# Patient Record
Sex: FEMALE | Race: WHITE | NOT HISPANIC OR LATINO | URBAN - METROPOLITAN AREA
[De-identification: names, ages, dates, MRNs, and addresses within clinical notes are randomized per-mention and may not be internally consistent; named-entity substitution may affect disease eponyms.]

---

## 2017-07-17 ENCOUNTER — INPATIENT (INPATIENT)
Facility: HOSPITAL | Age: 82
LOS: 9 days | DRG: 871 | End: 2017-07-27
Attending: INTERNAL MEDICINE | Admitting: INTERNAL MEDICINE
Payer: MEDICARE

## 2017-07-17 VITALS
SYSTOLIC BLOOD PRESSURE: 134 MMHG | HEART RATE: 95 BPM | RESPIRATION RATE: 22 BRPM | TEMPERATURE: 96 F | WEIGHT: 145.06 LBS | DIASTOLIC BLOOD PRESSURE: 77 MMHG | OXYGEN SATURATION: 86 %

## 2017-07-17 DIAGNOSIS — F02.81 DEMENTIA IN OTHER DISEASES CLASSIFIED ELSEWHERE, UNSPECIFIED SEVERITY, WITH BEHAVIORAL DISTURBANCE: ICD-10-CM

## 2017-07-17 DIAGNOSIS — M24.7 PROTRUSIO ACETABULI: ICD-10-CM

## 2017-07-17 DIAGNOSIS — R32 UNSPECIFIED URINARY INCONTINENCE: ICD-10-CM

## 2017-07-17 DIAGNOSIS — I24.8 OTHER FORMS OF ACUTE ISCHEMIC HEART DISEASE: ICD-10-CM

## 2017-07-17 DIAGNOSIS — Z79.82 LONG TERM (CURRENT) USE OF ASPIRIN: ICD-10-CM

## 2017-07-17 DIAGNOSIS — R55 SYNCOPE AND COLLAPSE: ICD-10-CM

## 2017-07-17 DIAGNOSIS — I95.9 HYPOTENSION, UNSPECIFIED: ICD-10-CM

## 2017-07-17 DIAGNOSIS — A41.9 SEPSIS, UNSPECIFIED ORGANISM: ICD-10-CM
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
## 2017-07-17 DIAGNOSIS — Z96.0 PRESENCE OF UROGENITAL IMPLANTS: ICD-10-CM

## 2017-07-17 DIAGNOSIS — D50.0 IRON DEFICIENCY ANEMIA SECONDARY TO BLOOD LOSS (CHRONIC): ICD-10-CM

## 2017-07-17 DIAGNOSIS — E87.2 ACIDOSIS: ICD-10-CM
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
## 2017-07-17 DIAGNOSIS — L89.629 PRESSURE ULCER OF LEFT HEEL, UNSPECIFIED STAGE: ICD-10-CM

## 2017-07-17 DIAGNOSIS — G30.9 ALZHEIMER'S DISEASE, UNSPECIFIED: ICD-10-CM

## 2017-07-17 DIAGNOSIS — Z29.9 ENCOUNTER FOR PROPHYLACTIC MEASURES, UNSPECIFIED: ICD-10-CM

## 2017-07-17 DIAGNOSIS — Z66 DO NOT RESUSCITATE: ICD-10-CM

## 2017-07-17 DIAGNOSIS — S32.302A UNSPECIFIED FRACTURE OF LEFT ILIUM, INITIAL ENCOUNTER FOR CLOSED FRACTURE: ICD-10-CM

## 2017-07-17 DIAGNOSIS — Z82.49 FAMILY HISTORY OF ISCHEMIC HEART DISEASE AND OTHER DISEASES OF THE CIRCULATORY SYSTEM: ICD-10-CM

## 2017-07-17 DIAGNOSIS — S83.90XA SPRAIN OF UNSPECIFIED SITE OF UNSPECIFIED KNEE, INITIAL ENCOUNTER: Chronic | ICD-10-CM
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
## 2017-07-17 DIAGNOSIS — I35.1 NONRHEUMATIC AORTIC (VALVE) INSUFFICIENCY: ICD-10-CM

## 2017-07-17 PROBLEM — Z00.00 ENCOUNTER FOR PREVENTIVE HEALTH EXAMINATION: Status: ACTIVE | Noted: 2017-07-17

## 2017-07-17 LAB
ALBUMIN SERPL ELPH-MCNC: 3.2 G/DL — LOW (ref 3.3–5)
ALP SERPL-CCNC: 107 U/L — SIGNIFICANT CHANGE UP (ref 40–120)
ALT FLD-CCNC: 60 U/L — SIGNIFICANT CHANGE UP (ref 12–78)
ANION GAP SERPL CALC-SCNC: 9 MMOL/L — SIGNIFICANT CHANGE UP (ref 5–17)
APTT BLD: 46 SEC — HIGH (ref 27.5–37.4)
AST SERPL-CCNC: 44 U/L — HIGH (ref 15–37)
BASE EXCESS BLDA CALC-SCNC: -14.6 MMOL/L — LOW (ref -2–2)
BASOPHILS # BLD AUTO: 0.2 K/UL — SIGNIFICANT CHANGE UP (ref 0–0.2)
BASOPHILS NFR BLD AUTO: 0.6 % — SIGNIFICANT CHANGE UP (ref 0–2)
BILIRUB SERPL-MCNC: 0.6 MG/DL — SIGNIFICANT CHANGE UP (ref 0.2–1.2)
BLOOD GAS COMMENTS ARTERIAL: SIGNIFICANT CHANGE UP
BUN SERPL-MCNC: 17 MG/DL — SIGNIFICANT CHANGE UP (ref 7–23)
CALCIUM SERPL-MCNC: 8.8 MG/DL — SIGNIFICANT CHANGE UP (ref 8.5–10.1)
CHLORIDE SERPL-SCNC: 115 MMOL/L — HIGH (ref 96–108)
CK MB BLD-MCNC: 1.4 % — SIGNIFICANT CHANGE UP (ref 0–3.5)
CK MB CFR SERPL CALC: 1.8 NG/ML — SIGNIFICANT CHANGE UP (ref 0–3.6)
CK SERPL-CCNC: 133 U/L — SIGNIFICANT CHANGE UP (ref 26–192)
CO2 SERPL-SCNC: 21 MMOL/L — LOW (ref 22–31)
CREAT SERPL-MCNC: 0.93 MG/DL — SIGNIFICANT CHANGE UP (ref 0.5–1.3)
EOSINOPHIL # BLD AUTO: 0 K/UL — SIGNIFICANT CHANGE UP (ref 0–0.5)
EOSINOPHIL NFR BLD AUTO: 0.2 % — SIGNIFICANT CHANGE UP (ref 0–6)
GLUCOSE SERPL-MCNC: 139 MG/DL — HIGH (ref 70–99)
HCO3 BLDA-SCNC: 14 MMOL/L — LOW (ref 23–27)
HCT VFR BLD CALC: 42.7 % — SIGNIFICANT CHANGE UP (ref 34.5–45)
HGB BLD-MCNC: 14.6 G/DL — SIGNIFICANT CHANGE UP (ref 11.5–15.5)
HOROWITZ INDEX BLDA+IHG-RTO: 40 — SIGNIFICANT CHANGE UP
INR BLD: 1.26 RATIO — HIGH (ref 0.88–1.16)
LACTATE SERPL-SCNC: 3.7 MMOL/L — HIGH (ref 0.7–2)
LACTATE SERPL-SCNC: 5 MMOL/L — CRITICAL HIGH (ref 0.7–2)
LYMPHOCYTES # BLD AUTO: 13.3 % — SIGNIFICANT CHANGE UP (ref 13–44)
LYMPHOCYTES # BLD AUTO: 3.3 K/UL — SIGNIFICANT CHANGE UP (ref 1–3.3)
MCHC RBC-ENTMCNC: 33.5 PG — SIGNIFICANT CHANGE UP (ref 27–34)
MCHC RBC-ENTMCNC: 34.3 GM/DL — SIGNIFICANT CHANGE UP (ref 32–36)
MCV RBC AUTO: 97.6 FL — SIGNIFICANT CHANGE UP (ref 80–100)
MONOCYTES # BLD AUTO: 1.2 K/UL — HIGH (ref 0–0.9)
MONOCYTES NFR BLD AUTO: 4.7 % — SIGNIFICANT CHANGE UP (ref 1–9)
NEUTROPHILS # BLD AUTO: 19.9 K/UL — HIGH (ref 1.8–7.4)
NEUTROPHILS NFR BLD AUTO: 81.2 % — HIGH (ref 43–77)
PCO2 BLDA: 19 MMHG — LOW (ref 32–46)
PH BLDA: 7.35 — SIGNIFICANT CHANGE UP (ref 7.35–7.45)
PLATELET # BLD AUTO: 192 K/UL — SIGNIFICANT CHANGE UP (ref 150–400)
PO2 BLDA: 62 MMHG — LOW (ref 74–108)
POTASSIUM SERPL-MCNC: 3.8 MMOL/L — SIGNIFICANT CHANGE UP (ref 3.5–5.3)
POTASSIUM SERPL-SCNC: 3.8 MMOL/L — SIGNIFICANT CHANGE UP (ref 3.5–5.3)
PROT SERPL-MCNC: 6.2 G/DL — SIGNIFICANT CHANGE UP (ref 6–8.3)
PROTHROM AB SERPL-ACNC: 13.8 SEC — HIGH (ref 9.8–12.7)
RBC # BLD: 4.37 M/UL — SIGNIFICANT CHANGE UP (ref 3.8–5.2)
RBC # FLD: 12.4 % — SIGNIFICANT CHANGE UP (ref 10.3–14.5)
SAO2 % BLDA: 89 % — LOW (ref 92–96)
SODIUM SERPL-SCNC: 145 MMOL/L — SIGNIFICANT CHANGE UP (ref 135–145)
TROPONIN I SERPL-MCNC: 0.05 NG/ML — HIGH (ref 0.01–0.04)
WBC # BLD: 24.6 K/UL — HIGH (ref 3.8–10.5)
WBC # FLD AUTO: 24.6 K/UL — HIGH (ref 3.8–10.5)

## 2017-07-17 PROCEDURE — 99291 CRITICAL CARE FIRST HOUR: CPT

## 2017-07-17 PROCEDURE — 93010 ELECTROCARDIOGRAM REPORT: CPT

## 2017-07-17 PROCEDURE — 71010: CPT | Mod: 26

## 2017-07-17 PROCEDURE — 70450 CT HEAD/BRAIN W/O DYE: CPT | Mod: 26

## 2017-07-17 RX ORDER — NOREPINEPHRINE BITARTRATE/D5W 8 MG/250ML
0.1 PLASTIC BAG, INJECTION (ML) INTRAVENOUS
Qty: 8 | Refills: 0 | Status: DISCONTINUED | OUTPATIENT
Start: 2017-07-17 | End: 2017-07-20

## 2017-07-17 RX ORDER — ACETAMINOPHEN 500 MG
650 TABLET ORAL EVERY 6 HOURS
Qty: 0 | Refills: 0 | Status: DISCONTINUED | OUTPATIENT
Start: 2017-07-17 | End: 2017-07-27

## 2017-07-17 RX ORDER — PIPERACILLIN AND TAZOBACTAM 4; .5 G/20ML; G/20ML
3.38 INJECTION, POWDER, LYOPHILIZED, FOR SOLUTION INTRAVENOUS EVERY 8 HOURS
Qty: 0 | Refills: 0 | Status: DISCONTINUED | OUTPATIENT
Start: 2017-07-17 | End: 2017-07-18

## 2017-07-17 RX ORDER — SODIUM CHLORIDE 9 MG/ML
1000 INJECTION INTRAMUSCULAR; INTRAVENOUS; SUBCUTANEOUS
Qty: 0 | Refills: 0 | Status: COMPLETED | OUTPATIENT
Start: 2017-07-17 | End: 2017-07-17

## 2017-07-17 RX ORDER — SODIUM BICARBONATE 1 MEQ/ML
50 SYRINGE (ML) INTRAVENOUS ONCE
Qty: 0 | Refills: 0 | Status: COMPLETED | OUTPATIENT
Start: 2017-07-17 | End: 2017-07-17

## 2017-07-17 RX ORDER — PIPERACILLIN AND TAZOBACTAM 4; .5 G/20ML; G/20ML
3.38 INJECTION, POWDER, LYOPHILIZED, FOR SOLUTION INTRAVENOUS ONCE
Qty: 0 | Refills: 0 | Status: COMPLETED | OUTPATIENT
Start: 2017-07-17 | End: 2017-07-17

## 2017-07-17 RX ORDER — SODIUM CHLORIDE 9 MG/ML
1000 INJECTION, SOLUTION INTRAVENOUS
Qty: 0 | Refills: 0 | Status: DISCONTINUED | OUTPATIENT
Start: 2017-07-17 | End: 2017-07-17

## 2017-07-17 RX ORDER — ENOXAPARIN SODIUM 100 MG/ML
40 INJECTION SUBCUTANEOUS EVERY 24 HOURS
Qty: 0 | Refills: 0 | Status: DISCONTINUED | OUTPATIENT
Start: 2017-07-17 | End: 2017-07-18

## 2017-07-17 RX ORDER — VANCOMYCIN HCL 1 G
1000 VIAL (EA) INTRAVENOUS ONCE
Qty: 0 | Refills: 0 | Status: COMPLETED | OUTPATIENT
Start: 2017-07-17 | End: 2017-07-17

## 2017-07-17 RX ORDER — SODIUM CHLORIDE 9 MG/ML
1000 INJECTION INTRAMUSCULAR; INTRAVENOUS; SUBCUTANEOUS ONCE
Qty: 0 | Refills: 0 | Status: COMPLETED | OUTPATIENT
Start: 2017-07-17 | End: 2017-07-17

## 2017-07-17 RX ORDER — ONDANSETRON 8 MG/1
4 TABLET, FILM COATED ORAL ONCE
Qty: 0 | Refills: 0 | Status: COMPLETED | OUTPATIENT
Start: 2017-07-17 | End: 2017-07-17

## 2017-07-17 RX ORDER — SODIUM BICARBONATE 1 MEQ/ML
0.23 SYRINGE (ML) INTRAVENOUS
Qty: 150 | Refills: 0 | Status: DISCONTINUED | OUTPATIENT
Start: 2017-07-17 | End: 2017-07-18

## 2017-07-17 RX ORDER — SODIUM CHLORIDE 9 MG/ML
1000 INJECTION, SOLUTION INTRAVENOUS ONCE
Qty: 0 | Refills: 0 | Status: COMPLETED | OUTPATIENT
Start: 2017-07-17 | End: 2017-07-17

## 2017-07-17 RX ORDER — NOREPINEPHRINE BITARTRATE/D5W 8 MG/250ML
1 PLASTIC BAG, INJECTION (ML) INTRAVENOUS
Qty: 8 | Refills: 0 | Status: DISCONTINUED | OUTPATIENT
Start: 2017-07-17 | End: 2017-07-17

## 2017-07-17 RX ADMIN — Medication 123.38 MICROGRAM(S)/KG/MIN: at 14:02

## 2017-07-17 RX ADMIN — SODIUM CHLORIDE 2000 MILLILITER(S): 9 INJECTION, SOLUTION INTRAVENOUS at 14:04

## 2017-07-17 RX ADMIN — Medication 50 MILLIEQUIVALENT(S): at 22:08

## 2017-07-17 RX ADMIN — Medication 100 MEQ/KG/HR: at 22:03

## 2017-07-17 RX ADMIN — SODIUM CHLORIDE 2000 MILLILITER(S): 9 INJECTION INTRAMUSCULAR; INTRAVENOUS; SUBCUTANEOUS at 10:15

## 2017-07-17 RX ADMIN — ENOXAPARIN SODIUM 40 MILLIGRAM(S): 100 INJECTION SUBCUTANEOUS at 17:24

## 2017-07-17 RX ADMIN — PIPERACILLIN AND TAZOBACTAM 25 GRAM(S): 4; .5 INJECTION, POWDER, LYOPHILIZED, FOR SOLUTION INTRAVENOUS at 21:32

## 2017-07-17 RX ADMIN — SODIUM CHLORIDE 2000 MILLILITER(S): 9 INJECTION, SOLUTION INTRAVENOUS at 18:20

## 2017-07-17 RX ADMIN — Medication 250 MILLIGRAM(S): at 11:43

## 2017-07-17 RX ADMIN — ONDANSETRON 4 MILLIGRAM(S): 8 TABLET, FILM COATED ORAL at 12:30

## 2017-07-17 RX ADMIN — SODIUM CHLORIDE 2000 MILLILITER(S): 9 INJECTION INTRAMUSCULAR; INTRAVENOUS; SUBCUTANEOUS at 11:17

## 2017-07-17 RX ADMIN — PIPERACILLIN AND TAZOBACTAM 200 GRAM(S): 4; .5 INJECTION, POWDER, LYOPHILIZED, FOR SOLUTION INTRAVENOUS at 11:13

## 2017-07-17 RX ADMIN — SODIUM CHLORIDE 2000 MILLILITER(S): 9 INJECTION INTRAMUSCULAR; INTRAVENOUS; SUBCUTANEOUS at 09:45

## 2017-07-17 RX ADMIN — SODIUM CHLORIDE 1000 MILLILITER(S): 9 INJECTION INTRAMUSCULAR; INTRAVENOUS; SUBCUTANEOUS at 12:30

## 2017-07-17 RX ADMIN — Medication 123.38 MICROGRAM(S)/KG/MIN: at 11:43

## 2017-07-17 NOTE — H&P ADULT - PROBLEM SELECTOR PLAN 2
continue aspirin qd  trend troponins, check echo; evaluate for cardiac etiology with hx of severe AS  CT head negative for acute pathology, f/u neuro recs  Cardiology (Dr. Borjas) consulted, Neuro (Dr. Gallegos) consulted

## 2017-07-17 NOTE — ED PROCEDURE NOTE - CPROC ED INTRAOSS CONFIRM1
flushing with fluid/needle stands without support/aspiration of blood/marrow mixture without difficulty

## 2017-07-17 NOTE — ED ADULT NURSE REASSESSMENT NOTE - NS ED NURSE REASSESS COMMENT FT1
spoke  with lab department about drawing the cultures, they were up drawing blood, and did not draw cultures

## 2017-07-17 NOTE — CONSULT NOTE ADULT - SUBJECTIVE AND OBJECTIVE BOX
CC: 82F presents with syncope and possible seizure with AMS.    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 She lives with  and has 24-hr aide who is at bedside. Per aide, she became stiff with hands outstretched and had generalized body shaking, then became unresponsive for about 10-15 minutes. Also, family states that she has been grinding her teeth more the last few days with episode of crying yesterday. Patient does not take any meds (only baby aspirin). Used to take valproic acid for behavioral disturbance due to alzheimer's but that had been discontinued over 6 months ago.    In the ED, patient was afebrile but hypotensive to 70s/40s. She received 3 liters NS with no response in BP, required starting Norepinephrine drip via IO placed in left proximal tibia. She received Vanc/Zosyn for suspected UTI, although burns had no urine output. CT Head showed no acute pathology and CXR showed no focal consolidation worrisome for pneumonia.    Allergies: No Known Allergies    PAST MEDICAL & SURGICAL HISTORY:  Aortic stenosis  Alzheimer disease (advanced: nonverbal, fully dependent on ADL's, on pureed diet)  Meniscal injury: s/p arthroscopy    FAMILY HISTORY:  Family history of MI (myocardial infarction)    SOCIAL HISTORY: never smoker, no alcohol or illicit drug use    Home Medications: aspirin 81 mg qd    Review of Systems: unable to obtain due to dementia    T(F): 97.8 (07-17-17 @ 12:59), Max: 98.8 (07-17-17 @ 09:50)  HR: 96 (07-17-17 @ 12:59) (81 - 96)  BP: 144/75 (07-17-17 @ 12:59) (70/39 - 161/54)  RR: 22 (07-17-17 @ 12:59) (22 - 22)  SpO2: 100% (07-17-17 @ 12:59)  Wt(kg): 65.8 kg    Physical Exam:     Gen: appears in distress, making noises, aggressively grinding teeth  Neuro:  does not respond to verbal stimuli (baseline per family); PERRL  HEENT: NC/AT; dry mucous membranes  CVS: normal S1 & S2; regular rate and rhythm   Resp: clear to auscultation bilaterally   Abd: soft; NT/ND   Ext: no edema; poor skin turgor  Skin: warm, well perfused; ulcer over left heel with minimal drainage, appears clean    Meds:  piperacillin/tazobactam IVPB. 3.375 Gram(s) IV Intermittent every 8 hours  norepinephrine Infusion 1 MICROgram(s)/kG/Min IV Continuous <Continuous>  acetaminophen  Suppository 650 milliGRAM(s) Rectal every 6 hours PRN  enoxaparin Injectable 40 milliGRAM(s) SubCutaneous every 24 hours  lactated ringers Bolus 1000 milliLiter(s) IV Bolus once                       14.6   24.6  )-----------( 192      ( 17 Jul 2017 09:59 )             42.7     145  |  115  |  17  ----------------------------<  139  3.8   |  21  |  0.93    Ca    8.8      17 Jul 2017 11:07    TPro  6.2  /  Alb  3.2<L>  /  TBili  0.6  /  DBili  x   /  AST  44<H>  /  ALT  60  /  AlkPhos  107  07-17    Lactate 3.7           07-17 @ 11:09    Lactate 5.0           07-17 @ 10:00      CARDIAC MARKERS ( 17 Jul 2017 11:09 )  .053 ng/mL / x     / 133 U/L / x     / 1.8 ng/mL    PT/INR - ( 17 Jul 2017 11:09 )   PT: 13.8 sec;   INR: 1.26 ratio      PTT - ( 17 Jul 2017 11:09 )  PTT:46.0 sec    Blood cultures: pending    UA/UCx: no urine in burns    CXR: clear lungs, no focal consolidation, no effusion, no pneumothorax    CODE STATUS: full  GOC discussion: Y  Critical care time spent (mins): 45

## 2017-07-17 NOTE — ED PROVIDER NOTE - CONSTITUTIONAL, MLM
normal... Well appearing, well nourished, awake, alert, moaning, some oriented to person, place, time/situation and in no apparent distress.

## 2017-07-17 NOTE — CONSULT NOTE ADULT - ASSESSMENT
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    1. Neuro: mental status now at baseline, hold off on antiepileptics at this point  2. CV: likely distributive shock, c/w norepinephrine, consider switch to phenylephrine given reported history of severe AS. Awaiting echo results from cardiologist (Dr. Durand). c/w asa 81 qd.  3. Pulm: no active issues, c/w supp O2 prn  4. GI: NPO for now  5. Renal/Metabolic: no urine output, monitor closely, bladder scan when arrives to ICU, keep burns in place. Strict I/O's, monitor BUN/Cr/K/HCO3. Start D5+LR@75/hr while NPO  6. ID: f/up cultures, c/w Zosyn, hold Vancomycin given not recently hospitalized  7. Heme: DVT ppx, trend leukocytosis (likely due to sepsis vs. seizure)  8. Endo: no active issues  9. Skin: L heel ulcer, does not appear infected, wound care eval; needs TLC  10. Dispo: full code, discussed with  and daughter at bedside in ED. Would not want prolonged life support, but will discuss this at that time should her status deteriorate. 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    1. Neuro: mental status now at baseline, hold off on antiepileptics at this point. Possible vasovagal syncope vs. seizure.  2. CV: likely distributive shock, c/w norepinephrine, consider switch to phenylephrine given reported history of severe AS. Awaiting echo results from cardiologist (Dr. Durand). c/w asa 81 qd.  3. Pulm: no active issues, c/w supp O2 prn  4. GI: NPO for now  5. Renal/Metabolic: no urine output, monitor closely, bladder scan when arrives to ICU, keep burns in place. Strict I/O's, monitor BUN/Cr/K/HCO3. Start D5+LR@75/hr while NPO  6. ID: f/up cultures, c/w Zosyn, hold Vancomycin given not recently hospitalized  7. Heme: DVT ppx, trend leukocytosis (likely due to sepsis vs. seizure)  8. Endo: no active issues  9. Skin: L heel ulcer, does not appear infected, wound care eval; needs TLC  10. Dispo: full code, discussed with  and daughter at bedside in ED. Would not want prolonged life support, but will discuss this at that time should her status deteriorate. 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    1. Neuro: mental status now at baseline, hold off on antiepileptics at this point. Possible vasovagal syncope vs. seizure.  2. CV: likely distributive shock, c/w norepinephrine, consider switch to phenylephrine given reported history of severe AS. Awaiting echo results from cardiologist (Dr. Durand). c/w asa 81 qd.  3. Pulm: no active issues, c/w supp O2 prn  4. GI: NPO for now  5. Renal/Metabolic: no urine output, monitor closely, bladder scan when arrives to ICU, keep burns in place. Strict I/O's, monitor BUN/Cr/K/HCO3. Start D5+LR@75/hr while NPO  6. ID: f/up cultures, c/w Zosyn, hold Vancomycin given not recently hospitalized  7. Heme: DVT ppx, trend leukocytosis (likely due to sepsis vs. seizure)  8. Endo: no active issues  9. Skin: L heel ulcer, does not appear infected, wound care eval; resolved shock, d/c IO in AM if remains stable, PIV in R arm placed  10. Dispo: full code, discussed with  and daughter at bedside in ED. Would not want prolonged life support, but will discuss this at that time should her status deteriorate. Prognosis guarded  CC time spent: 45 min

## 2017-07-17 NOTE — ED PROCEDURE NOTE - CPROC ED INFUS LINE DETAIL1
The location was identified, and the area was draped and prepped./The catheter was placed using sterile technique.

## 2017-07-17 NOTE — ED PROVIDER NOTE - OBJECTIVE STATEMENT
83 yo F p/w this am was in her nl baseline. Then ~ 45 min pta, pt passed out on toilet. No fall / trauma. Pt was found by EMS to be hypotensive and hypoxic. Pt is now more altered than usual. No focal weakness noted. No recent fall / trauma. NO recent illness. NO other hx avail.

## 2017-07-17 NOTE — CONSULT NOTE ADULT - SUBJECTIVE AND OBJECTIVE BOX
CMS-- episode of stiffening --- questionable sz, vs stiffening secondary to cerebral hypoperfusion -- keeps -120-- no AED at present  speiss work up   spoke to son shaka

## 2017-07-17 NOTE — ED PROVIDER NOTE - CRITICAL CARE PROVIDED
additional history taking/interpretation of diagnostic studies/consult w/ pt's family directly relating to pts condition/consultation with other physicians/direct patient care (not related to procedure)/documentation

## 2017-07-17 NOTE — ED ADULT NURSE REASSESSMENT NOTE - NS ED NURSE REASSESS COMMENT FT1
Pt is hypotensive. Norephinephrine IV ordered BP is now 161/54. additional bolus given. IO on the left knee started. abx started. Bladder scan showed 21cc, no urine output from straight cath.  Rectal temp of 98.8. Pt sating at 100 on 2 L NC. HRs at 40s, Dr Vidal aware, verbal order received to increased norephinephrine to 2, and immediately decrease to 1 mcg. when HR went up to 80s. awaiting ICU consult

## 2017-07-17 NOTE — ED ADULT NURSE NOTE - PLAN OF CARE
Bedside visitors/Position of comfort/Call bell/NPO/Side rails/Explanation of exam/test/Fall precautions

## 2017-07-17 NOTE — H&P ADULT - HISTORY OF PRESENT ILLNESS
81 y/o F with PMHx    In the ED, labs were significant for leukocytosis with WBC of 24.6, PT/INR of 13.8/1.26, aPTT of 46.0, Lactate of 5.0. Patient was afebrile but became hypotensive to 70/39. Given NS boluses x 5 and vanco/zosyn x 1. Had IO placed in left proximal tibia and started on levophed. EKG showed NSR at 66bpm, unchanged from prior study. CT head was negative for acute pathology, showed chronic brain findings c/w small vessel disease. CXR showed no acute pathology. Dr. Borjas (cardio) and Dr. Harris (ICU) evaluated patient. 83 y/o F with PMHx of Alzheimer Dementia, aortic stenosis presented to the ED with syncope and hypotension. Patient lives at home with  and 24 hour aide Romy. Per aide, patient was getting a sponge bath when her arms became stiff. She was on the commode and began "shaking violently," and held her hand to her chest and . Patient is non-verbal so  and aide were unsure if she was in pain or this was an involuntary motion. Family states that her current level of consciousness is her baseline. Family states that she has been grinding her teeth for the last 6 months, and was grinding it more intensely on the day prior to admission. Patient is incontinent of urine at baseline, using diapers. Family denies patient being febrile. Of note, patient was on depakote for Alzheimer dementia and not for seizures. Has been off depakote for the last year.      In the ED, labs were significant for leukocytosis with WBC of 24.6, PT/INR of 13.8/1.26, aPTT of 46.0, Lactate of 5.0. Patient was afebrile but became hypotensive to 70/39. Given NS boluses x 5 and vanco/zosyn x 1. Had IO placed in left proximal tibia and started on levophed. EKG showed NSR at 66bpm, unchanged from prior study. CT head was negative for acute pathology, showed chronic brain findings c/w small vessel disease. CXR showed no acute pathology. Dr. Borjas (cardio) and Dr. Harris (ICU) evaluated patient. 81 y/o F with PMHx of Alzheimer Dementia, aortic stenosis presented to the ED with syncope and hypotension. Patient lives at home with  and 24 hour aide Romy. Per aide, patient was getting a sponge bath when her arms became stiff. She was on the commode and began "shaking violently," and held her hand to her chest and . Patient is non-verbal so  and aide were unsure if she was in pain or this was an involuntary motion. Family states that her current level of consciousness is her baseline. Family states that she has been grinding her teeth for the last 6 months, and was grinding it more intensely on the day prior to admission. Patient is incontinent of urine at baseline, using diapers. Family denies patient being febrile. Had one episode of nonbloody, nonbilious vomiting in the ED. Of note, patient was on depakote for Alzheimer dementia and not for seizures. Has been off depakote for the last year.      In the ED, labs were significant for leukocytosis with WBC of 24.6, PT/INR of 13.8/1.26, aPTT of 46.0, Lactate of 5.0. Patient was afebrile but became hypotensive to 70/39. Given NS boluses x 5 and vanco/zosyn x 1. Had IO placed in left proximal tibia and started on levophed. EKG showed NSR at 66bpm, unchanged from prior study. CT head was negative for acute pathology, showed chronic brain findings c/w small vessel disease. CXR showed no acute pathology. Dr. Borjas (cardio) and Dr. Harris (ICU) evaluated patient.

## 2017-07-17 NOTE — H&P ADULT - NSHPSOCIALHISTORY_GEN_ALL_CORE
, lives with  and 24 hour aide  Ambulates with assistance from aide and family  Denies smoking  Denies ETOH use

## 2017-07-17 NOTE — H&P ADULT - ASSESSMENT
83 y/o F with PMHx of Alzheimer Dementia, aortic stenosis presented with syncopal episode admitted with septic shock, positive troponin.

## 2017-07-17 NOTE — H&P ADULT - PROBLEM SELECTOR PLAN 1
admit to ICU  continue aggressive fluid resuscitation with caution due to patient's severe AS   initiate pressor support through central line  f/u UA, Blood Cultures, Urinalysis, repeat lactate  investigate source of infection; check mouth closely for signs of abscess  continue broad spectrum antibiotics  wound care consult for left heel wound  further care per ICU

## 2017-07-17 NOTE — ED PROVIDER NOTE - PROGRESS NOTE DETAILS
Dw Pts Daughter in law, Dr Nanda Rojas, agree with rx, admit to [facility] / [facility] Pt with drop in SBP ~ 80 - L Tibial IO line placed. Dw Dr D [facility], will see pt to admit. Jeancarlos pt , family and Dr Rojas by phone. Jeancarlos Hagen, agree with Levo

## 2017-07-17 NOTE — ED ADULT NURSE NOTE - OBJECTIVE STATEMENT
Brought in by EMS, as per EMS, pt passed out in the toilet and was hypotensive. as per private care taker, denies any head trauma. Brought in by EMS, as per EMS, pt passed out in the toilet and was hypotensive. as per private care taker, denies any head trauma. patients skin was diaphoretic upon arrival to ER

## 2017-07-17 NOTE — H&P ADULT - NSHPSOURCEINFORD_GEN_ALL_CORE
Chart(s)/Other Family Member/Patient Chart(s)/Home Health Aide or Other Non-Family Caregiver/Spouse/Significant Other/Child

## 2017-07-17 NOTE — CONSULT NOTE ADULT - SUBJECTIVE AND OBJECTIVE BOX
CHIEF COMPLAINT: Patient is a 82y old  Female who presents with a chief complaint of Pt passed out at home (17 Jul 2017 15:56)      HPI:  83 y/o F with PMHx of Alzheimer Dementia, aortic stenosis presented to the ED with syncope and hypotension. Patient lives at home with  and 24 hour aide Romy. Per aide, patient was getting a sponge bath when her arms became stiff. She was on the commode and began "shaking violently," and held her hand to her chest and . Patient is non-verbal so  and aide were unsure if she was in pain or this was an involuntary motion. Family states that her current level of consciousness is her baseline. Family states that she has been grinding her teeth for the last 6 months, and was grinding it more intensely on the day prior to admission. Patient is incontinent of urine at baseline, using diapers. Family denies patient being febrile. Had one episode of nonbloody, nonbilious vomiting in the ED. Of note, patient was on depakote for Alzheimer dementia and not for seizures. Has been off depakote for the last year.      In the ED, labs were significant for leukocytosis with WBC of 24.6, PT/INR of 13.8/1.26, aPTT of 46.0, Lactate of 5.0. Patient was afebrile but became hypotensive to 70/39. Given NS boluses x 5 and vanco/zosyn x 1. Had IO placed in left proximal tibia and started on levophed. EKG showed NSR at 66bpm, unchanged from prior study. CT head was negative for acute pathology, showed chronic brain findings c/w small vessel disease. CXR showed no acute pathology. Dr. Borjas (cardio) and Dr. Harris (ICU) evaluated patient. (17 Jul 2017 12:14)      EKG: SR LVH c repol    REVIEW OF SYSTEMS:   All other review of systems are negative unless indicated above    PAST MEDICAL & SURGICAL HISTORY:  Aortic stenosis  Alzheimer disease  Meniscal injury: s/p arthroscopy      SOCIAL HISTORY:  No tobacco, ethanol, or drug abuse.    FAMILY HISTORY:  Family history of MI (myocardial infarction)    No family history of acute MI or sudden cardiac death.    MEDICATIONS  (STANDING):  enoxaparin Injectable 40 milliGRAM(s) SubCutaneous every 24 hours  piperacillin/tazobactam IVPB. 3.375 Gram(s) IV Intermittent every 8 hours  dextrose 5% + lactated ringers. 1000 milliLiter(s) (75 mL/Hr) IV Continuous <Continuous>  norepinephrine Infusion 0.1 MICROgram(s)/kG/Min (12.338 mL/Hr) IV Continuous <Continuous>    MEDICATIONS  (PRN):  acetaminophen  Suppository 650 milliGRAM(s) Rectal every 6 hours PRN For Temp greater than 38 C (100.4 F)      Allergies    No Known Allergies    Intolerances            VITAL SIGNS:   Vital Signs Last 24 Hrs  T(C): 36.1 (17 Jul 2017 17:00), Max: 37.1 (17 Jul 2017 09:50)  T(F): 97 (17 Jul 2017 17:00), Max: 98.8 (17 Jul 2017 09:50)  HR: 107 (17 Jul 2017 18:46) (81 - 117)  BP: 116/55 (17 Jul 2017 18:46) (70/39 - 161/54)  BP(mean): 79 (17 Jul 2017 18:46) (55 - 88)  RR: 28 (17 Jul 2017 18:46) (21 - 29)  SpO2: 98% (17 Jul 2017 18:46) (86% - 100%)    I&O's Summary    17 Jul 2017 07:01  -  17 Jul 2017 19:38  --------------------------------------------------------  IN: 1150 mL / OUT: 0 mL / NET: 1150 mL        On Exam:  TELE: SR  Constitutional: lethargic  HEENT: Dry Mucous Membranes, Anicteric  Pulmonary: Decreased breath sounds b/l.   Cardiovascular: Tachy , S1 and S2, distant 1/6 Sm  Gastrointestinal: Bowel Sounds present, soft, nontender.   Lymph: No peripheral edema. No lymphadenopathy.  Skin: No visible rashes or ulcers.  Psych:  lethagic    LABS: All Labs Reviewed:                        14.6   24.6  )-----------( 192      ( 17 Jul 2017 09:59 )             42.7     17 Jul 2017 11:07    145    |  115    |  17     ----------------------------<  139    3.8     |  21     |  0.93     Ca    8.8        17 Jul 2017 11:07    TPro  6.2    /  Alb  3.2    /  TBili  0.6    /  DBili  x      /  AST  44     /  ALT  60     /  AlkPhos  107    17 Jul 2017 11:07    PT/INR - ( 17 Jul 2017 11:09 )   PT: 13.8 sec;   INR: 1.26 ratio         PTT - ( 17 Jul 2017 11:09 )  PTT:46.0 sec  CARDIAC MARKERS ( 17 Jul 2017 11:09 )  .053 ng/mL / x     / 133 U/L / x     / 1.8 ng/mL      Blood Culture:         RADIOLOGY:    < from: Xray Chest 1 View AP/PA (07.17.17 @ 10:11) >    EXAM:  CHEST 1 VIEW                            PROCEDURE DATE:  07/17/2017          INTERPRETATION:  Syncope.    AP chest.    Heart not grossly enlarged. Atherosclerotic aorta. No consolidation or   effusion.        < from: CT Head No Cont (07.17.17 @ 10:43) >    EXAM:  CT BRAIN                            PROCEDURE DATE:  07/17/2017          INTERPRETATION:  CT head performed noncontrast mode. Patient has had an   episode of altered mental status and syncope. Time of study 10:21 AM.    The bones are grosslyintact.    In the head there is age typical advanced generalized volume loss of the   brain. There is abundant low dense change in the periventricular white   matter consistent with small vessel disease.    No acute blood collections or subarachnoid hemorrhages are evident.    There is no sense of acute territorial infarct, mass, or edema.    IMPRESSION: Chronic brain findings as above.                PAULA HICKEY M.D., ATTENDING RADIOLOGIST  This document has been electronically signed. Jul 17 2017 10:47AM                < end of copied text >        Impression: No active disease.                CHANCE MORGAN M.D., ATTENDING RADIOLOGIST  This document has been electronically signed. Jul 17 2017 10:12AM                < end of copied text >

## 2017-07-17 NOTE — H&P ADULT - NSHPPHYSICALEXAM_GEN_ALL_CORE
Physical Exam:  General: thin elderly female, nonverbal, appears distressed  HEENT: NCAT, PERRLA, EOMI bl, moist mucous membranes   Neck: Supple, nontender, no mass  Neurology: A&Ox3, nonfocal, CN II-XII grossly intact, sensation intact, no gait abnormalities   Respiratory: CTA B/L, No W/R/R  CV: RRR, +S1/S2, no murmurs, rubs or gallops  Abdominal: Soft, NT, ND +BSx4  Extremities: No C/C/E, + peripheral pulses  MSK: Normal ROM, no joint erythema or warmth, no joint swelling   Skin: warm, dry, normal color, no rash or abnormal lesions Physical Exam: limited due to patient's mental status  General: thin elderly female, nonverbal, appears distressed, grinding teeth  HEENT: NCAT, PERRLA, EOMI bl, dry mucous membranes, poor dentition  Neck: Supple, nontender, no mass  Neurology: A&Ox0, nonfocal exam, unable to fully assess neurological status due to patient's dementia  Respiratory: CTA B/L, No W/R/R  CV: RRR, +S1/S2, +systolic murmur  Abdominal: Soft, ND +BSx4  Extremities: No C/C/E, +peripheral pulses  MSK: Unable to assess ROM, no joint erythema or warmth, no joint swelling   Skin: warm, dry; +black eschar on left medial foot, +wound under left foot with minimal pus/drainage Physical Exam: limited due to patient's mental status  General: thin elderly female, nonverbal, appears distressed, grinding teeth  HEENT: NCAT, PERRLA, EOMI bl, dry mucous membranes, poor dentition  Neck: Supple, nontender, no mass  Neurology: A&Ox0, nonfocal exam, unable to fully assess neurological status due to patient's dementia  Respiratory: CTA B/L, No W/R/R  CV: RRR, +S1/S2, +systolic murmur  Abdominal: Soft, ND +BSx4  Extremities: No C/C/E, +peripheral pulses  MSK: Unable to assess ROM, no joint erythema or warmth, no joint swelling   Skin: warm, dry; +black eschar on left medial foot, +wound under left foot with minimal pus/drainage, +IO in left proximal tibia

## 2017-07-17 NOTE — CONSULT NOTE ADULT - ASSESSMENT
83 y/o F with PMHx of Alzheimer Dementia, severe aortic stenosis presented to the ED with syncope and hypotension.  - Likely pt is septic. Cont Abx.   - No off of pressor support. Cont IVF  - No signs of significant ischemia or volume overload. Monitor closely for ADHF given severe AS  - Now very tachypneic from acidosis. resp status tenuous  - Monitor and replete electrolytes. Keep K>4.0 and Mg>2.0.   - Further cardiac workup will depend on clinical course.   - All other workup per primary team. Will followup.   Pt with extremely poor prognosis. pall care would be appropriate.   Patient at high risk for decompensation. Critically ill. >35 minutes of critical care time was spent with this patient.

## 2017-07-17 NOTE — ED PROVIDER NOTE - CARE PLAN
Principal Discharge DX:	Syncope, unspecified syncope type  Secondary Diagnosis:	Leukocytosis, unspecified type  Secondary Diagnosis:	Hypotension, unspecified hypotension type

## 2017-07-18 DIAGNOSIS — G30.8 OTHER ALZHEIMER'S DISEASE: ICD-10-CM

## 2017-07-18 DIAGNOSIS — I35.0 NONRHEUMATIC AORTIC (VALVE) STENOSIS: ICD-10-CM

## 2017-07-18 DIAGNOSIS — K81.9 CHOLECYSTITIS, UNSPECIFIED: ICD-10-CM

## 2017-07-18 DIAGNOSIS — D72.829 ELEVATED WHITE BLOOD CELL COUNT, UNSPECIFIED: ICD-10-CM

## 2017-07-18 DIAGNOSIS — I21.4 NON-ST ELEVATION (NSTEMI) MYOCARDIAL INFARCTION: ICD-10-CM

## 2017-07-18 DIAGNOSIS — D50.0 IRON DEFICIENCY ANEMIA SECONDARY TO BLOOD LOSS (CHRONIC): ICD-10-CM

## 2017-07-18 DIAGNOSIS — S32.409A UNSPECIFIED FRACTURE OF UNSPECIFIED ACETABULUM, INITIAL ENCOUNTER FOR CLOSED FRACTURE: ICD-10-CM

## 2017-07-18 LAB
ABO RH CONFIRMATION: SIGNIFICANT CHANGE UP
ALBUMIN SERPL ELPH-MCNC: 2 G/DL — LOW (ref 3.3–5)
ALBUMIN SERPL ELPH-MCNC: 2.4 G/DL — LOW (ref 3.3–5)
ALP SERPL-CCNC: 43 U/L — SIGNIFICANT CHANGE UP (ref 40–120)
ALP SERPL-CCNC: 53 U/L — SIGNIFICANT CHANGE UP (ref 40–120)
ALT FLD-CCNC: 47 U/L — SIGNIFICANT CHANGE UP (ref 12–78)
ALT FLD-CCNC: 55 U/L — SIGNIFICANT CHANGE UP (ref 12–78)
ANION GAP SERPL CALC-SCNC: 14 MMOL/L — SIGNIFICANT CHANGE UP (ref 5–17)
ANION GAP SERPL CALC-SCNC: 17 MMOL/L — SIGNIFICANT CHANGE UP (ref 5–17)
ANION GAP SERPL CALC-SCNC: 18 MMOL/L — HIGH (ref 5–17)
ANION GAP SERPL CALC-SCNC: 19 MMOL/L — HIGH (ref 5–17)
APPEARANCE UR: ABNORMAL
APTT BLD: 34.8 SEC — SIGNIFICANT CHANGE UP (ref 27.5–37.4)
AST SERPL-CCNC: 61 U/L — HIGH (ref 15–37)
AST SERPL-CCNC: 65 U/L — HIGH (ref 15–37)
BACTERIA # UR AUTO: ABNORMAL
BASOPHILS # BLD AUTO: 0.1 K/UL — SIGNIFICANT CHANGE UP (ref 0–0.2)
BASOPHILS NFR BLD AUTO: 0.4 % — SIGNIFICANT CHANGE UP (ref 0–2)
BILIRUB DIRECT SERPL-MCNC: 0.1 MG/DL — SIGNIFICANT CHANGE UP (ref 0.05–0.2)
BILIRUB INDIRECT FLD-MCNC: 0.6 MG/DL — SIGNIFICANT CHANGE UP (ref 0.2–1)
BILIRUB SERPL-MCNC: 0.7 MG/DL — SIGNIFICANT CHANGE UP (ref 0.2–1.2)
BILIRUB SERPL-MCNC: 0.9 MG/DL — SIGNIFICANT CHANGE UP (ref 0.2–1.2)
BILIRUB UR-MCNC: NEGATIVE — SIGNIFICANT CHANGE UP
BUN SERPL-MCNC: 19 MG/DL — SIGNIFICANT CHANGE UP (ref 7–23)
BUN SERPL-MCNC: 24 MG/DL — HIGH (ref 7–23)
BUN SERPL-MCNC: 25 MG/DL — HIGH (ref 7–23)
BUN SERPL-MCNC: 25 MG/DL — HIGH (ref 7–23)
CALCIUM SERPL-MCNC: 6.3 MG/DL — CRITICAL LOW (ref 8.5–10.1)
CALCIUM SERPL-MCNC: 7.5 MG/DL — LOW (ref 8.5–10.1)
CALCIUM SERPL-MCNC: 7.7 MG/DL — LOW (ref 8.5–10.1)
CALCIUM SERPL-MCNC: 7.7 MG/DL — LOW (ref 8.5–10.1)
CHLORIDE SERPL-SCNC: 100 MMOL/L — SIGNIFICANT CHANGE UP (ref 96–108)
CHLORIDE SERPL-SCNC: 110 MMOL/L — HIGH (ref 96–108)
CHLORIDE SERPL-SCNC: 111 MMOL/L — HIGH (ref 96–108)
CHLORIDE SERPL-SCNC: 112 MMOL/L — HIGH (ref 96–108)
CK MB BLD-MCNC: 1.8 % — SIGNIFICANT CHANGE UP (ref 0–3.5)
CK MB BLD-MCNC: 2.7 % — SIGNIFICANT CHANGE UP (ref 0–3.5)
CK MB CFR SERPL CALC: 27.1 NG/ML — HIGH (ref 0–3.6)
CK MB CFR SERPL CALC: 31.2 NG/ML — HIGH (ref 0–3.6)
CK SERPL-CCNC: 1151 U/L — HIGH (ref 26–192)
CK SERPL-CCNC: 1498 U/L — HIGH (ref 26–192)
CO2 SERPL-SCNC: 15 MMOL/L — LOW (ref 22–31)
CO2 SERPL-SCNC: 17 MMOL/L — LOW (ref 22–31)
CO2 SERPL-SCNC: 19 MMOL/L — LOW (ref 22–31)
CO2 SERPL-SCNC: 31 MMOL/L — SIGNIFICANT CHANGE UP (ref 22–31)
COLOR SPEC: YELLOW — SIGNIFICANT CHANGE UP
COMMENT - URINE: SIGNIFICANT CHANGE UP
CREAT SERPL-MCNC: 1.4 MG/DL — HIGH (ref 0.5–1.3)
CREAT SERPL-MCNC: 1.4 MG/DL — HIGH (ref 0.5–1.3)
CREAT SERPL-MCNC: 1.5 MG/DL — HIGH (ref 0.5–1.3)
CREAT SERPL-MCNC: 1.6 MG/DL — HIGH (ref 0.5–1.3)
DIFF PNL FLD: ABNORMAL
EOSINOPHIL # BLD AUTO: 0 K/UL — SIGNIFICANT CHANGE UP (ref 0–0.5)
EOSINOPHIL NFR BLD AUTO: 0 % — SIGNIFICANT CHANGE UP (ref 0–6)
EPI CELLS # UR: SIGNIFICANT CHANGE UP
GLUCOSE SERPL-MCNC: 164 MG/DL — HIGH (ref 70–99)
GLUCOSE SERPL-MCNC: 174 MG/DL — HIGH (ref 70–99)
GLUCOSE SERPL-MCNC: 262 MG/DL — HIGH (ref 70–99)
GLUCOSE SERPL-MCNC: 550 MG/DL — CRITICAL HIGH (ref 70–99)
GLUCOSE UR QL: NEGATIVE — SIGNIFICANT CHANGE UP
HCT VFR BLD CALC: 20.1 % — CRITICAL LOW (ref 34.5–45)
HCT VFR BLD CALC: 23 % — LOW (ref 34.5–45)
HGB BLD-MCNC: 6.9 G/DL — CRITICAL LOW (ref 11.5–15.5)
HGB BLD-MCNC: 8 G/DL — LOW (ref 11.5–15.5)
HYALINE CASTS # UR AUTO: ABNORMAL /LPF
INR BLD: 1.83 RATIO — HIGH (ref 0.88–1.16)
KETONES UR-MCNC: ABNORMAL
LACTATE SERPL-SCNC: 11.7 MMOL/L — CRITICAL HIGH (ref 0.7–2)
LACTATE SERPL-SCNC: 12.9 MMOL/L — CRITICAL HIGH (ref 0.7–2)
LACTATE SERPL-SCNC: 13 MMOL/L — CRITICAL HIGH (ref 0.7–2)
LACTATE SERPL-SCNC: 13.7 MMOL/L — CRITICAL HIGH (ref 0.7–2)
LACTATE SERPL-SCNC: 13.8 MMOL/L — CRITICAL HIGH (ref 0.7–2)
LEUKOCYTE ESTERASE UR-ACNC: NEGATIVE — SIGNIFICANT CHANGE UP
LYMPHOCYTES # BLD AUTO: 1.7 K/UL — SIGNIFICANT CHANGE UP (ref 1–3.3)
LYMPHOCYTES # BLD AUTO: 8.1 % — LOW (ref 13–44)
MAGNESIUM SERPL-MCNC: 1.7 MG/DL — SIGNIFICANT CHANGE UP (ref 1.6–2.6)
MAGNESIUM SERPL-MCNC: 1.8 MG/DL — SIGNIFICANT CHANGE UP (ref 1.6–2.6)
MCHC RBC-ENTMCNC: 32.9 PG — SIGNIFICANT CHANGE UP (ref 27–34)
MCHC RBC-ENTMCNC: 33.2 PG — SIGNIFICANT CHANGE UP (ref 27–34)
MCHC RBC-ENTMCNC: 34.3 GM/DL — SIGNIFICANT CHANGE UP (ref 32–36)
MCHC RBC-ENTMCNC: 34.7 GM/DL — SIGNIFICANT CHANGE UP (ref 32–36)
MCV RBC AUTO: 95.6 FL — SIGNIFICANT CHANGE UP (ref 80–100)
MCV RBC AUTO: 95.8 FL — SIGNIFICANT CHANGE UP (ref 80–100)
MONOCYTES # BLD AUTO: 1 K/UL — HIGH (ref 0–0.9)
MONOCYTES NFR BLD AUTO: 4.5 % — SIGNIFICANT CHANGE UP (ref 1–9)
NEUTROPHILS # BLD AUTO: 18.7 K/UL — HIGH (ref 1.8–7.4)
NEUTROPHILS NFR BLD AUTO: 87 % — HIGH (ref 43–77)
NITRITE UR-MCNC: NEGATIVE — SIGNIFICANT CHANGE UP
OB PNL STL: NEGATIVE — SIGNIFICANT CHANGE UP
PH UR: 5 — SIGNIFICANT CHANGE UP (ref 5–8)
PHOSPHATE SERPL-MCNC: 2.6 MG/DL — SIGNIFICANT CHANGE UP (ref 2.5–4.5)
PHOSPHATE SERPL-MCNC: 2.7 MG/DL — SIGNIFICANT CHANGE UP (ref 2.5–4.5)
PLATELET # BLD AUTO: 55 K/UL — LOW (ref 150–400)
PLATELET # BLD AUTO: 70 K/UL — LOW (ref 150–400)
POTASSIUM SERPL-MCNC: 3.6 MMOL/L — SIGNIFICANT CHANGE UP (ref 3.5–5.3)
POTASSIUM SERPL-MCNC: 3.7 MMOL/L — SIGNIFICANT CHANGE UP (ref 3.5–5.3)
POTASSIUM SERPL-MCNC: 3.7 MMOL/L — SIGNIFICANT CHANGE UP (ref 3.5–5.3)
POTASSIUM SERPL-MCNC: 3.8 MMOL/L — SIGNIFICANT CHANGE UP (ref 3.5–5.3)
POTASSIUM SERPL-SCNC: 3.6 MMOL/L — SIGNIFICANT CHANGE UP (ref 3.5–5.3)
POTASSIUM SERPL-SCNC: 3.7 MMOL/L — SIGNIFICANT CHANGE UP (ref 3.5–5.3)
POTASSIUM SERPL-SCNC: 3.7 MMOL/L — SIGNIFICANT CHANGE UP (ref 3.5–5.3)
POTASSIUM SERPL-SCNC: 3.8 MMOL/L — SIGNIFICANT CHANGE UP (ref 3.5–5.3)
PROT SERPL-MCNC: 3.8 G/DL — LOW (ref 6–8.3)
PROT SERPL-MCNC: 4.7 G/DL — LOW (ref 6–8.3)
PROT UR-MCNC: 25 MG/DL
PROTHROM AB SERPL-ACNC: 20.2 SEC — HIGH (ref 9.8–12.7)
RBC # BLD: 2.1 M/UL — LOW (ref 3.8–5.2)
RBC # BLD: 2.4 M/UL — LOW (ref 3.8–5.2)
RBC # FLD: 12.9 % — SIGNIFICANT CHANGE UP (ref 10.3–14.5)
RBC # FLD: 13.2 % — SIGNIFICANT CHANGE UP (ref 10.3–14.5)
RBC CASTS # UR COMP ASSIST: ABNORMAL /HPF (ref 0–4)
SODIUM SERPL-SCNC: 145 MMOL/L — SIGNIFICANT CHANGE UP (ref 135–145)
SODIUM SERPL-SCNC: 145 MMOL/L — SIGNIFICANT CHANGE UP (ref 135–145)
SODIUM SERPL-SCNC: 146 MMOL/L — HIGH (ref 135–145)
SODIUM SERPL-SCNC: 147 MMOL/L — HIGH (ref 135–145)
SP GR SPEC: 1.02 — SIGNIFICANT CHANGE UP (ref 1.01–1.02)
TROPONIN I SERPL-MCNC: 2.46 NG/ML — HIGH (ref 0.01–0.04)
TROPONIN I SERPL-MCNC: 3.52 NG/ML — HIGH (ref 0.01–0.04)
UROBILINOGEN FLD QL: NEGATIVE — SIGNIFICANT CHANGE UP
VANCOMYCIN TROUGH SERPL-MCNC: 5.9 UG/ML — LOW (ref 10–20)
WBC # BLD: 21.5 K/UL — HIGH (ref 3.8–10.5)
WBC # BLD: 30.2 K/UL — HIGH (ref 3.8–10.5)
WBC # FLD AUTO: 21.5 K/UL — HIGH (ref 3.8–10.5)
WBC # FLD AUTO: 30.2 K/UL — HIGH (ref 3.8–10.5)
WBC UR QL: SIGNIFICANT CHANGE UP

## 2017-07-18 PROCEDURE — 99292 CRITICAL CARE ADDL 30 MIN: CPT

## 2017-07-18 PROCEDURE — 76700 US EXAM ABDOM COMPLETE: CPT | Mod: 26

## 2017-07-18 PROCEDURE — 71010: CPT | Mod: 26

## 2017-07-18 PROCEDURE — 99223 1ST HOSP IP/OBS HIGH 75: CPT

## 2017-07-18 PROCEDURE — 99291 CRITICAL CARE FIRST HOUR: CPT

## 2017-07-18 PROCEDURE — 71010: CPT | Mod: 26,77

## 2017-07-18 PROCEDURE — 93010 ELECTROCARDIOGRAM REPORT: CPT

## 2017-07-18 PROCEDURE — 93306 TTE W/DOPPLER COMPLETE: CPT | Mod: 26

## 2017-07-18 PROCEDURE — 74176 CT ABD & PELVIS W/O CONTRAST: CPT | Mod: 26

## 2017-07-18 PROCEDURE — 73590 X-RAY EXAM OF LOWER LEG: CPT | Mod: 26,LT

## 2017-07-18 RX ORDER — SODIUM BICARBONATE 1 MEQ/ML
100 SYRINGE (ML) INTRAVENOUS ONCE
Qty: 0 | Refills: 0 | Status: COMPLETED | OUTPATIENT
Start: 2017-07-18 | End: 2017-07-18

## 2017-07-18 RX ORDER — MEROPENEM 1 G/30ML
INJECTION INTRAVENOUS
Qty: 0 | Refills: 0 | Status: DISCONTINUED | OUTPATIENT
Start: 2017-07-18 | End: 2017-07-21

## 2017-07-18 RX ORDER — VASOPRESSIN 20 [USP'U]/ML
0.04 INJECTION INTRAVENOUS
Qty: 100 | Refills: 0 | Status: DISCONTINUED | OUTPATIENT
Start: 2017-07-18 | End: 2017-07-20

## 2017-07-18 RX ORDER — IOHEXOL 300 MG/ML
500 INJECTION, SOLUTION INTRAVENOUS
Qty: 0 | Refills: 0 | Status: COMPLETED | OUTPATIENT
Start: 2017-07-18 | End: 2017-07-18

## 2017-07-18 RX ORDER — GLUCAGON INJECTION, SOLUTION 0.5 MG/.1ML
1 INJECTION, SOLUTION SUBCUTANEOUS ONCE
Qty: 0 | Refills: 0 | Status: DISCONTINUED | OUTPATIENT
Start: 2017-07-18 | End: 2017-07-21

## 2017-07-18 RX ORDER — SODIUM CHLORIDE 9 MG/ML
3000 INJECTION, SOLUTION INTRAVENOUS ONCE
Qty: 0 | Refills: 0 | Status: COMPLETED | OUTPATIENT
Start: 2017-07-18 | End: 2017-07-18

## 2017-07-18 RX ORDER — SODIUM CHLORIDE 9 MG/ML
1000 INJECTION, SOLUTION INTRAVENOUS
Qty: 0 | Refills: 0 | Status: DISCONTINUED | OUTPATIENT
Start: 2017-07-18 | End: 2017-07-21

## 2017-07-18 RX ORDER — IOHEXOL 300 MG/ML
30 INJECTION, SOLUTION INTRAVENOUS ONCE
Qty: 0 | Refills: 0 | Status: DISCONTINUED | OUTPATIENT
Start: 2017-07-18 | End: 2017-07-18

## 2017-07-18 RX ORDER — DEXTROSE 50 % IN WATER 50 %
25 SYRINGE (ML) INTRAVENOUS ONCE
Qty: 0 | Refills: 0 | Status: DISCONTINUED | OUTPATIENT
Start: 2017-07-18 | End: 2017-07-21

## 2017-07-18 RX ORDER — DEXTROSE 50 % IN WATER 50 %
1 SYRINGE (ML) INTRAVENOUS ONCE
Qty: 0 | Refills: 0 | Status: DISCONTINUED | OUTPATIENT
Start: 2017-07-18 | End: 2017-07-21

## 2017-07-18 RX ORDER — SODIUM BICARBONATE 1 MEQ/ML
0.11 SYRINGE (ML) INTRAVENOUS
Qty: 150 | Refills: 0 | Status: DISCONTINUED | OUTPATIENT
Start: 2017-07-18 | End: 2017-07-19

## 2017-07-18 RX ORDER — VANCOMYCIN HCL 1 G
VIAL (EA) INTRAVENOUS
Qty: 0 | Refills: 0 | Status: DISCONTINUED | OUTPATIENT
Start: 2017-07-18 | End: 2017-07-19

## 2017-07-18 RX ORDER — MEROPENEM 1 G/30ML
1000 INJECTION INTRAVENOUS ONCE
Qty: 0 | Refills: 0 | Status: COMPLETED | OUTPATIENT
Start: 2017-07-18 | End: 2017-07-18

## 2017-07-18 RX ORDER — DEXTROSE 50 % IN WATER 50 %
12.5 SYRINGE (ML) INTRAVENOUS ONCE
Qty: 0 | Refills: 0 | Status: DISCONTINUED | OUTPATIENT
Start: 2017-07-18 | End: 2017-07-21

## 2017-07-18 RX ORDER — SODIUM CHLORIDE 9 MG/ML
1000 INJECTION, SOLUTION INTRAVENOUS ONCE
Qty: 0 | Refills: 0 | Status: COMPLETED | OUTPATIENT
Start: 2017-07-18 | End: 2017-07-18

## 2017-07-18 RX ORDER — VANCOMYCIN HCL 1 G
1000 VIAL (EA) INTRAVENOUS EVERY 24 HOURS
Qty: 0 | Refills: 0 | Status: DISCONTINUED | OUTPATIENT
Start: 2017-07-19 | End: 2017-07-19

## 2017-07-18 RX ORDER — MEROPENEM 1 G/30ML
1000 INJECTION INTRAVENOUS EVERY 12 HOURS
Qty: 0 | Refills: 0 | Status: DISCONTINUED | OUTPATIENT
Start: 2017-07-18 | End: 2017-07-21

## 2017-07-18 RX ORDER — VANCOMYCIN HCL 1 G
1000 VIAL (EA) INTRAVENOUS ONCE
Qty: 0 | Refills: 0 | Status: COMPLETED | OUTPATIENT
Start: 2017-07-18 | End: 2017-07-18

## 2017-07-18 RX ORDER — INSULIN LISPRO 100/ML
VIAL (ML) SUBCUTANEOUS EVERY 6 HOURS
Qty: 0 | Refills: 0 | Status: DISCONTINUED | OUTPATIENT
Start: 2017-07-18 | End: 2017-07-21

## 2017-07-18 RX ORDER — PHYTONADIONE (VIT K1) 5 MG
5 TABLET ORAL ONCE
Qty: 0 | Refills: 0 | Status: COMPLETED | OUTPATIENT
Start: 2017-07-18 | End: 2017-07-18

## 2017-07-18 RX ADMIN — IOHEXOL 500 MILLILITER(S): 300 INJECTION, SOLUTION INTRAVENOUS at 16:24

## 2017-07-18 RX ADMIN — MEROPENEM 200 MILLIGRAM(S): 1 INJECTION INTRAVENOUS at 17:50

## 2017-07-18 RX ADMIN — Medication 1: at 12:44

## 2017-07-18 RX ADMIN — IOHEXOL 500 MILLILITER(S): 300 INJECTION, SOLUTION INTRAVENOUS at 17:01

## 2017-07-18 RX ADMIN — Medication 101 MILLIGRAM(S): at 20:19

## 2017-07-18 RX ADMIN — Medication 100 MEQ/KG/HR: at 07:40

## 2017-07-18 RX ADMIN — PIPERACILLIN AND TAZOBACTAM 25 GRAM(S): 4; .5 INJECTION, POWDER, LYOPHILIZED, FOR SOLUTION INTRAVENOUS at 05:50

## 2017-07-18 RX ADMIN — VASOPRESSIN 2.4 UNIT(S)/MIN: 20 INJECTION INTRAVENOUS at 09:01

## 2017-07-18 RX ADMIN — Medication 12.34 MICROGRAM(S)/KG/MIN: at 11:15

## 2017-07-18 RX ADMIN — Medication 50 MEQ/KG/HR: at 10:25

## 2017-07-18 RX ADMIN — Medication 12.34 MICROGRAM(S)/KG/MIN: at 05:51

## 2017-07-18 RX ADMIN — Medication 250 MILLIGRAM(S): at 12:51

## 2017-07-18 RX ADMIN — Medication 100 MILLIEQUIVALENT(S): at 03:21

## 2017-07-18 RX ADMIN — Medication 1: at 17:50

## 2017-07-18 RX ADMIN — SODIUM CHLORIDE 1500 MILLILITER(S): 9 INJECTION, SOLUTION INTRAVENOUS at 05:52

## 2017-07-18 RX ADMIN — ENOXAPARIN SODIUM 40 MILLIGRAM(S): 100 INJECTION SUBCUTANEOUS at 17:49

## 2017-07-18 RX ADMIN — SODIUM CHLORIDE 1000 MILLILITER(S): 9 INJECTION, SOLUTION INTRAVENOUS at 03:28

## 2017-07-18 RX ADMIN — MEROPENEM 200 MILLIGRAM(S): 1 INJECTION INTRAVENOUS at 10:00

## 2017-07-18 NOTE — CONSULT NOTE ADULT - ASSESSMENT
A/P: 82y Female with AMS/Sepsis consulted for r/o compartment syndrome of LLE    -Compartment syndrome unlikely based on examination  -Neurovascular checks of the LLE Q 6 hours to assess for any change to the limb  -No acute orthopedic surgical intervention indicated at this time A/P: 82y Female with AMS/Sepsis consulted for r/o compartment syndrome of LLE    -Compartment syndrome unlikely based on examination  -Neurovascular checks of the LLE Q 6 hours to assess for any change to the limb  -No acute orthopedic surgical intervention indicated at this time    Patient seen and evaluated with Dr. Cedeño PGY3 A/P: 82y Female with AMS/Sepsis consulted for r/o compartment syndrome of LLE    -Compartment syndrome unlikely based on examination  - Pain control WBAT  - DVT ppx  - PT/OT  - c/w ICU management and care  -No acute orthopedic surgical intervention at this time  - Attending aware and agrees with above  -FU with Dr. Cardoso outpatient as needed in 1-2 weeks after D/C  -Ortho Stable    Patient seen and evaluated with Dr. eCdeño PGY3

## 2017-07-18 NOTE — PROGRESS NOTE ADULT - ASSESSMENT
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    1. Neuro: mental status decreased, hold off on antiepileptics at this point. Possible seizure.  2. CV: likely distributive shock, c/w norepinephrine add vasopressin. Awaiting echo results from cardiologist (Dr. Durand). c/w asa 81 qd.  3. Pulm: no active issues, c/w NC O2  4. GI: NPO for now  5. Renal/Metabolic:  Chandler now placed, strict I/Os, monitor BUN/Cr/K/HCO3  6. ID: Per ID start meropenem f/up cultures, c/w Zosyn, hold Vancomycin given not recently hospitalized  7. Heme: DVT ppx, trend leukocytosis (likely due to sepsis vs. seizure)  8. Endo: no active issues  9. Skin: L heel ulcer, does not appear infected, wound care eval; resolved shock, d/c IO in AM if remains stable, PIV in R arm placed  10. Dispo: full code, discussed with  and daughter at bedside in ED. Would not want prolonged life support, but will discuss this at that time should her status deteriorate. Prognosis guarded

## 2017-07-18 NOTE — PROCEDURE NOTE - NSPROCDETAILS_GEN_ALL_CORE
sterile technique, catheter placed/sterile dressing applied/guidewire recovered/ultrasound guidance/lumen(s) aspirated and flushed

## 2017-07-18 NOTE — CONSULT NOTE ADULT - ATTENDING COMMENTS
Pt's medical history, clinical symptoms reviewed.  case reviewed with resident.  No clinical symptoms for compartment syndrome.  Continue to monitor for leg swelling for next 24 hours as well for cellulitis or abscess formation due to fluid extravasations in a delayed fashion.

## 2017-07-18 NOTE — PROGRESS NOTE ADULT - SUBJECTIVE AND OBJECTIVE BOX
Interval events: Patient required multiple liters of fluid overnight and norepinephrine to maintain SBP. Per  patient is less alert than at baseline.     Review of Systems:  unable to assess d/t patient status    T(F): 98.2 (17 @ 04:01), Max: 98.8 (17 @ 09:50)  HR: 98 (17 @ 07:03) (81 - 126)  BP: 78/54 (17 @ 07:03) (70/39 - 161/54)  RR: 21 (17 @ 07:03) (20 - 29)  SpO2: 100% (17 @ 07:03) (86% - 100%)  Wt(kg): --    CAPILLARY BLOOD GLUCOSE  246 (2017 03:00)    I&O's Summary    2017 07:01  -  2017 07:00  --------------------------------------------------------  IN: 3391.5 mL / OUT: 170 mL / NET: 3221.5 mL    Physical Exam:     Gen: minimally responsive to pain, not responsive to verbal,   Neuro: eyes open spontaneously, nothing verbal  HEENT: PERRLA, mucous membranes moist  CV: Regular rate and rhythm, systolic ejection murmur,   Pulm: CTAB, no wheezes rhonchi or rales  GI: abdomen soft, non-distended  Ext: moving all extremities spontaneously, LLE colder relative to RLE with noted swelling of anterior   Skin: extensive bruising noted to bilateral UE    Meds:  enoxaparin Injectable 40 milliGRAM(s) SubCutaneous every 24 hours  piperacillin/tazobactam IVPB. 3.375 Gram(s) IV Intermittent every 8 hours  norepinephrine Infusion 0.1 MICROgram(s)/kG/Min IV Continuous <Continuous>  vasopressin Infusion 0.04 Unit(s)/Min IV Continuous <Continuous>  acetaminophen  Suppository 650 milliGRAM(s) Rectal every 6 hours PRN  sodium bicarbonate  Infusion 0.228 mEq/kG/Hr IV Continuous <Continuous>                        6.9    21.5  )-----------( x        ( 2017 06:35 )             20.1     18    147<H>  |  112<H>  |  24<H>  ----------------------------<  164<H>  3.7   |  17<L>  |  1.40<H>    Ca    7.5<L>      2017 06:35  Phos  2.7       Mg     1.8         TPro  3.8<L>  /  Alb  2.0<L>  /  TBili  0.7  /  DBili  .10  /  AST  61<H>  /  ALT  47  /  AlkPhos  43      Lactate 13.8           18 @ 06:35  Lactate 13.0            @ 03:29  Lactate 11.7           18 @ 02:24  Lactate 3.7           17 @ 11:09  Lactate 5.0           17 @ 10:00    CARDIAC MARKERS ( 2017 06:35 )  3.560 ng/mL / x     / 509 U/L / x     / 17.8 ng/mL  CARDIAC MARKERS ( 2017 11:09 )  .053 ng/mL / x     / 133 U/L / x     / 1.8 ng/mL    PT/INR - ( 2017 11:09 )   PT: 13.8 sec;   INR: 1.26 ratio       PTT - ( 2017 11:09 )  PTT:46.0 sec  Urinalysis Basic - ( 2017 01:07 )  Color: Yellow / Appearance: x / S.020 / pH: x  Gluc: x / Ketone: Trace  / Bili: Negative / Urobili: Negative   Blood: x / Protein: 25 mg/dL / Nitrite: Negative   Leuk Esterase: Negative / RBC: 3-5 /HPF / WBC 3-5   Sq Epi: x / Non Sq Epi: Few / Bacteria: Few    ABG - ( 2017 21:20 )  pH: 7.35  /  pCO2: 19    /  pO2: 62    / HCO3: 14    / Base Excess: -14.6 /  SaO2: 89          Radiology: ***    Bedside Lung U/S: ***    Bedside Cardiac U/S: ***    CENTRAL LINE: Y/N   DATE INSERTED:   REMOVE: Y/N    MADDOX: Y/N      DATE INSERTED:        REMOVE: Y/N    A-LINE: Y/N     DATE INSERTED:              REMOVE: Y/N    GLOBAL ISSUE/BEST PRACTICE:  Analgesia:  Sedation:  HOB elevation: yes  Stress ulcer prophylaxis:  VTE prophylaxis:  Glycemic control:  Nutrition:    CODE STATUS: *** Interval events: Patient required multiple liters of fluid overnight and norepinephrine to maintain SBP. Per  patient is less alert than at baseline.     Review of Systems:  unable to assess d/t patient status    T(F): 98.2 (17 @ 04:01), Max: 98.8 (17 @ 09:50)  HR: 98 (17 @ 07:03) (81 - 126)  BP: 78/54 (17 @ 07:03) (70/39 - 161/54)  RR: 21 (17 @ 07:03) (20 - 29)  SpO2: 100% (17 @ 07:03) (86% - 100%)  Wt(kg): --    CAPILLARY BLOOD GLUCOSE  246 (2017 03:00)    I&O's Summary    2017 07:01  -  2017 07:00  --------------------------------------------------------  IN: 3391.5 mL / OUT: 170 mL / NET: 3221.5 mL    Physical Exam:     Gen: minimally responsive to pain, not responsive to verbal,   Neuro: eyes open spontaneously, nothing verbal  HEENT: PERRLA, mucous membranes moist  CV: Regular rate and rhythm, systolic ejection murmur,   Pulm: CTAB, no wheezes rhonchi or rales  GI: abdomen soft, non-distended  Ext: moving all extremities spontaneously, LLE colder relative to RLE with noted swelling of anterior   Skin: extensive bruising noted to bilateral UE    Meds:  enoxaparin Injectable 40 milliGRAM(s) SubCutaneous every 24 hours  piperacillin/tazobactam IVPB. 3.375 Gram(s) IV Intermittent every 8 hours  norepinephrine Infusion 0.1 MICROgram(s)/kG/Min IV Continuous <Continuous>  vasopressin Infusion 0.04 Unit(s)/Min IV Continuous <Continuous>  acetaminophen  Suppository 650 milliGRAM(s) Rectal every 6 hours PRN  sodium bicarbonate  Infusion 0.228 mEq/kG/Hr IV Continuous <Continuous>                        6.9    21.5  )-----------( x        ( 2017 06:35 )             20.1     18    147<H>  |  112<H>  |  24<H>  ----------------------------<  164<H>  3.7   |  17<L>  |  1.40<H>    Ca    7.5<L>      2017 06:35  Phos  2.7       Mg     1.8         TPro  3.8<L>  /  Alb  2.0<L>  /  TBili  0.7  /  DBili  .10  /  AST  61<H>  /  ALT  47  /  AlkPhos  43  -    Lactate 13.8           18 @ 06:35  Lactate 13.0            @ 03:29  Lactate 11.7           18 @ 02:24  Lactate 3.7           17 @ 11:09  Lactate 5.0           17 @ 10:00    CARDIAC MARKERS ( 2017 06:35 )  3.560 ng/mL / x     / 509 U/L / x     / 17.8 ng/mL  CARDIAC MARKERS ( 2017 11:09 )  .053 ng/mL / x     / 133 U/L / x     / 1.8 ng/mL    PT/INR - ( 2017 11:09 )   PT: 13.8 sec;   INR: 1.26 ratio       PTT - ( 2017 11:09 )  PTT:46.0 sec  Urinalysis Basic - ( 2017 01:07 )  Color: Yellow / Appearance: x / S.020 / pH: x  Gluc: x / Ketone: Trace  / Bili: Negative / Urobili: Negative   Blood: x / Protein: 25 mg/dL / Nitrite: Negative   Leuk Esterase: Negative / RBC: 3-5 /HPF / WBC 3-5   Sq Epi: x / Non Sq Epi: Few / Bacteria: Few    ABG - ( 2017 21:20 )  pH: 7.35  /  pCO2: 19    /  pO2: 62    / HCO3: 14    / Base Excess: -14.6 /  SaO2: 89              CENTRAL LINE: Y   DATE INSERTED: 2017   REMOVE: N    MADDOX: Y      DATE INSERTED: 2017        REMOVE: N    A-LINE: Y/N     DATE INSERTED:              REMOVE: Y/N    GLOBAL ISSUE/BEST PRACTICE:  Analgesia: yes  Sedation: no  HOB elevation: yes  Stress ulcer prophylaxis: yes  VTE prophylaxis: yes      CODE STATUS: DNR/DNI    82F PMH Advanced Alzheimer's dementia (nonverbal, 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    1. Neuro: mental status decreased, hold off on antiepileptics at this point. Possible seizure.  2. CV: likely distributive shock, c/w norepinephrine add vasopressin. Awaiting echo results from cardiologist (Dr. Durand). c/w asa 81 qd.  3. Pulm: no active issues, c/w NC O2  4. GI: NPO for now  5. Renal/Metabolic:  Maddox now placed, strict I/Os, monitor BUN/Cr/K/HCO3  6. ID: Per ID start meropenem f/up cultures, c/w Zosyn, hold Vancomycin given not recently hospitalized  7. Heme: DVT ppx, trend leukocytosis (likely due to sepsis vs. seizure)  8. Endo: no active issues  9. Skin: L heel ulcer, does not appear infected, wound care eval; resolved shock, d/c IO in AM if remains stable, PIV in R arm placed  10. Dispo: full code, discussed with  and daughter at bedside in ED. Would not want prolonged life support, but will discuss this at that time should her status deteriorate. Prognosis guarded Interval events: Patient required multiple liters of fluid overnight and norepinephrine to maintain SBP.    RIJ TLC placed for IV access  LLE intraossous line infiltrated, io removed, worsening LLE edema  lactate rising  Per  patient is less alert than at baseline.   now DNR/DNI      Review of Systems:  unable to assess d/t patient status    T(F): 98.2 (17 @ 04:01), Max: 98.8 (17 @ 09:50)  HR: 98 (17 @ 07:03) (81 - 126)  BP: 78/54 (17 @ 07:03) (70/39 - 161/54)  RR: 21 (17 @ 07:03) (20 - 29)  SpO2: 100% (17 @ 07:03) (86% - 100%)    CAPILLARY BLOOD GLUCOSE  246 (2017 03:00)    I&O's Summary    2017 07:01  -  2017 07:00  --------------------------------------------------------  IN: 3391.5 mL / OUT: 170 mL / NET: 3221.5 mL    Physical Exam:     Gen: minimally responsive to pain, not responsive to verbal,   Neuro: eyes open spontaneously, nothing verbal  HEENT: PERRLA, mucous membranes moist  CV: Regular rate and rhythm, systolic ejection murmur,   Pulm: CTAB, no wheezes rhonchi or rales  GI: abdomen soft, non-distended  Ext: moving all extremities spontaneously, LLE colder relative to RLE with noted swelling of anterior   Skin: extensive bruising noted to bilateral UE    Meds:  enoxaparin Injectable 40 milliGRAM(s) SubCutaneous every 24 hours  piperacillin/tazobactam IVPB. 3.375 Gram(s) IV Intermittent every 8 hours  norepinephrine Infusion 0.1 MICROgram(s)/kG/Min IV Continuous <Continuous>  vasopressin Infusion 0.04 Unit(s)/Min IV Continuous <Continuous>  acetaminophen  Suppository 650 milliGRAM(s) Rectal every 6 hours PRN  sodium bicarbonate  Infusion 0.228 mEq/kG/Hr IV Continuous <Continuous>                        6.9    21.5  )-----------( x        ( 2017 06:35 )             20.1     07-18    147<H>  |  112<H>  |  24<H>  ----------------------------<  164<H>  3.7   |  17<L>  |  1.40<H>    Ca    7.5<L>      2017 06:35  Phos  2.7       Mg     1.8         TPro  3.8<L>  /  Alb  2.0<L>  /  TBili  0.7  /  DBili  .10  /  AST  61<H>  /  ALT  47  /  AlkPhos  43      Lactate 13.8           18 @ 06:35  Lactate 13.0           18 @ 03:29  Lactate 11.7           18 @ 02:24  Lactate 3.7            @ 11:09  Lactate 5.0            @ 10:00    CARDIAC MARKERS ( 2017 06:35 )  3.560 ng/mL / x     / 509 U/L / x     / 17.8 ng/mL  CARDIAC MARKERS ( 2017 11:09 )  .053 ng/mL / x     / 133 U/L / x     / 1.8 ng/mL    PT/INR - ( 2017 11:09 )   PT: 13.8 sec;   INR: 1.26 ratio       PTT - ( 2017 11:09 )  PTT:46.0 sec  Urinalysis Basic - ( 2017 01:07 )  Color: Yellow / Appearance: x / S.020 / pH: x  Gluc: x / Ketone: Trace  / Bili: Negative / Urobili: Negative   Blood: x / Protein: 25 mg/dL / Nitrite: Negative   Leuk Esterase: Negative / RBC: 3-5 /HPF / WBC 3-5   Sq Epi: x / Non Sq Epi: Few / Bacteria: Few    ABG - ( 2017 21:20 )  pH: 7.35  /  pCO2: 19    /  pO2: 62    / HCO3: 14    / Base Excess: -14.6 /  SaO2: 89        CENTRAL LINE: Y   DATE INSERTED: 2017   REMOVE: N    MADDOX: Y      DATE INSERTED: 2017        REMOVE: N    A-LINE: Y/N     DATE INSERTED:              REMOVE: Y/N    GLOBAL ISSUE/BEST PRACTICE:  Analgesia: yes  Sedation: no  HOB elevation: yes  Stress ulcer prophylaxis: yes  VTE prophylaxis: yes      CODE STATUS: DNR/DNI

## 2017-07-18 NOTE — PROGRESS NOTE ADULT - ASSESSMENT
83 y/o F with PMHx of Alzheimer Dementia, severe aortic stenosis per records presented to the ED with syncope and hypotension. Sepsis with multiorgan involvement and hypoperfusion  - CE trending up, likely in the setting of general hypoperfusion, ischemia possible; Lateral ST depressions on most recent EKG; would continue ASA 81 if no contraindication. Family prefers comfort care.  - Septic shock most likely. Cont Abx.   - Currently on pressors with elevated lactate, drop in hemoglobin, transfuse prn  - No major volume overload at current time. Monitor closely for ADHF given significant AS  - Remains tachypneic from acidosis. Respiratory status tenuous  - Monitor and replete electrolytes. Keep K>4.0 and Mg>2.0.   - Further cardiac workup will depend on clinical course.   - All other workup per primary team. Will followup.   Pt with extremely poor prognosis. pall care would be appropriate.   Patient at high risk for decompensation. Critically ill. >35 minutes of critical care time was spent with this patient.

## 2017-07-18 NOTE — CONSULT NOTE ADULT - EXTREMITIES COMMENTS
Left lower extremity edema,  skin blisters clear (Previous IO access infiltration).  Calf otherwise soft.  + Pulses.  No reaction to flexion/extension (not painful).  Small 1.5 cm stage II heel ulceration clean base, no cellulitis or purulence.

## 2017-07-18 NOTE — CONSULT NOTE ADULT - PROBLEM SELECTOR PROBLEM 3
Alzheimer's dementia with behavioral disturbance, unspecified timing of dementia onset
Acalculous cholecystitis

## 2017-07-18 NOTE — PROGRESS NOTE ADULT - ATTENDING COMMENTS
82F PMH Advanced Alzheimer's dementia (nonverbal, fully dependent on ADL's) and severe aortic stenosis presents with episode of syncope (? possible seizure) with septic shock of unclear source, complicated by encephalopathy, ASHLEY, severe lactic acidosis, NSTEMI.    --encephalopathy likely related to sepsis, uremia, acidemia  no evidence of current seizure, no AEDs at this time  --profound septic shock, on levophed 0.5 this am, vasopressin added  demand ischemia in setting of multiorgan failure, trend cardiac biomarkers and EKG  hold ASA in setting of coagulopathy (from sepsis, liver dysfunction) and possible need for procedures  f/u echo  --stable respiratory status for now, at high risk for decompensation given AS, may require BiPAP  --ASHLEY, likely prerenal v ATN,   check urine lytes, renal US  NaHCO3 gtt for acidemia  UOP improving thoughout the day  --possible GI source of sepsis, thickened GB on US, and ? LLQ pain, check CT a/p with PO contrast, NGT placed for contrast  --empiric Vanc, meropenem for septic shock and rising leukocytosis  trend lactate, not improving despite decreasing pressor requirements this afternoon  --anemia likely dilutional component, stool neg for FOBT.  --discussed plan with , son, daughter, and daughter in law in detail.  They understand she has high likelihood of death.  We will continue current care but pt DNR/DNI.    *this evening CT a/p showed thickened GB with pericholecystic fluid and also b/l acute acetabular fractures and Fx of left iliac with hematoma and hemorrhagic ascites.  2 PRBCs ordered.  Vit K to reverse coagulopathy.  Ortho reconsult.  Surgery consult re: cholecystitis.  Findings and plan discussed in detail with son.    Pt critically ill. Total CC time 120min

## 2017-07-18 NOTE — CONSULT NOTE ADULT - GASTROINTESTINAL DETAILS
no distention/no rebound tenderness/no masses palpable/no organomegaly/bowel sounds normal/soft/no rigidity

## 2017-07-18 NOTE — CONSULT NOTE ADULT - SUBJECTIVE AND OBJECTIVE BOX
Consult Orthopedics    Patient is a 82y Female who was admitted for sepsis/AMS requiring massive volume resuscitation. The patient received an IO in the Left Tibia last night. Per ICU the patient's IO displaced and between 1-2 L of fluid extravasated into the left lower extremity. The patient has a baseline dementia and is non-verbal.     HEALTH ISSUES - PROBLEM Dx:  Need for prophylactic measure: Need for prophylactic measure  Syncope, unspecified syncope type: Syncope, unspecified syncope type  Septic shock: Septic shock    MEDICATIONS  (STANDING):  enoxaparin Injectable 40 milliGRAM(s) SubCutaneous every 24 hours  piperacillin/tazobactam IVPB. 3.375 Gram(s) IV Intermittent every 8 hours  norepinephrine Infusion 0.1 MICROgram(s)/kG/Min IV Continuous <Continuous>  sodium bicarbonate  Infusion 0.228 mEq/kG/Hr IV Continuous <Continuous>  vasopressin Infusion 0.04 Unit(s)/Min IV Continuous <Continuous>    Allergies    No Known Allergies    Intolerances    PAST MEDICAL & SURGICAL HISTORY:  Aortic stenosis  Alzheimer disease  Meniscal injury: s/p arthroscopy                          6.9    21.5  )-----------( x        ( 2017 06:35 )             20.1       2017 06:35    147    |  112    |  24     ----------------------------<  164    3.7     |  17     |  1.40     Ca    7.5        2017 06:35  Phos  2.7       2017 06:35  Mg     1.8       2017 06:35    TPro  3.8    /  Alb  2.0    /  TBili  0.7    /  DBili  .10    /  AST  61     /  ALT  47     /  AlkPhos  43     2017 06:35      PT/INR - ( 2017 11:09 )   PT: 13.8 sec;   INR: 1.26 ratio         PTT - ( 2017 11:09 )  PTT:46.0 sec    Urinalysis Basic - ( 2017 01:07 )    Color: Yellow / Appearance: x / S.020 / pH: x  Gluc: x / Ketone: Trace  / Bili: Negative / Urobili: Negative   Blood: x / Protein: 25 mg/dL / Nitrite: Negative   Leuk Esterase: Negative / RBC: 3-5 /HPF / WBC 3-5   Sq Epi: x / Non Sq Epi: Few / Bacteria: Few      Vital Signs Last 24 Hrs  T(C): 36.8 (17 @ 04:01), Max: 37.1 (17 @ 09:50)  T(F): 98.2 (17 @ 04:01), Max: 98.8 (17 @ 09:50)  HR: 98 (17 @ 08:00) (81 - 126)  BP: 84/54 (17 @ 08:00) (70/39 - 161/54)  BP(mean): 64 (17 @ 08:00) (55 - 88)  RR: 22 (17 @ 08:00) (19 - 29)  SpO2: 94% (17 @ 08:00) (86% - 100%)    Gen: NAD  Pt S/E at bedside, no acute events overnight, pain controlled    AVSS  Gen: NAD, non-responsive to verbal    Left Lower Extremity:  Noted diffuse swelling  +EHL/FHL/TA/GS  SILT L3-S1  +DP/PT Pulses  Compartments compressible  Negative babinski  No grimace/pain response on dorsiflexion of the ankle Consult Orthopedics    Patient is a 82y Female who was admitted for sepsis/AMS requiring massive volume resuscitation. The patient received an IO in the Left Tibia last night. Per ICU the patient's IO displaced and between 1-2 L of fluid extravasated into the left lower extremity. The patient has a baseline dementia and is non-verbal.     HEALTH ISSUES - PROBLEM Dx:  Need for prophylactic measure: Need for prophylactic measure  Syncope, unspecified syncope type: Syncope, unspecified syncope type  Septic shock: Septic shock    MEDICATIONS  (STANDING):  enoxaparin Injectable 40 milliGRAM(s) SubCutaneous every 24 hours  piperacillin/tazobactam IVPB. 3.375 Gram(s) IV Intermittent every 8 hours  norepinephrine Infusion 0.1 MICROgram(s)/kG/Min IV Continuous <Continuous>  sodium bicarbonate  Infusion 0.228 mEq/kG/Hr IV Continuous <Continuous>  vasopressin Infusion 0.04 Unit(s)/Min IV Continuous <Continuous>    Allergies    No Known Allergies    Intolerances    PAST MEDICAL & SURGICAL HISTORY:  Aortic stenosis  Alzheimer disease  Meniscal injury: s/p arthroscopy                          6.9    21.5  )-----------( x        ( 2017 06:35 )             20.1       2017 06:35    147    |  112    |  24     ----------------------------<  164    3.7     |  17     |  1.40     Ca    7.5        2017 06:35  Phos  2.7       2017 06:35  Mg     1.8       2017 06:35    TPro  3.8    /  Alb  2.0    /  TBili  0.7    /  DBili  .10    /  AST  61     /  ALT  47     /  AlkPhos  43     2017 06:35      PT/INR - ( 2017 11:09 )   PT: 13.8 sec;   INR: 1.26 ratio         PTT - ( 2017 11:09 )  PTT:46.0 sec    Urinalysis Basic - ( 2017 01:07 )    Color: Yellow / Appearance: x / S.020 / pH: x  Gluc: x / Ketone: Trace  / Bili: Negative / Urobili: Negative   Blood: x / Protein: 25 mg/dL / Nitrite: Negative   Leuk Esterase: Negative / RBC: 3-5 /HPF / WBC 3-5   Sq Epi: x / Non Sq Epi: Few / Bacteria: Few      Vital Signs Last 24 Hrs  T(C): 36.8 (17 @ 04:01), Max: 37.1 (17 @ 09:50)  T(F): 98.2 (17 @ 04:01), Max: 98.8 (17 @ 09:50)  HR: 98 (17 @ 08:00) (81 - 126)  BP: 84/54 (17 @ 08:00) (70/39 - 161/54)  BP(mean): 64 (17 @ 08:00) (55 - 88)  RR: 22 (17 @ 08:00) (19 - 29)  SpO2: 94% (17 @ 08:00) (86% - 100%)    Gen: NAD  Pt S/E at bedside, no acute events overnight, pain controlled    AVSS  Gen: NAD, non-responsive to verbal    Left Lower Extremity:  Noted diffuse swelling  +EHL/FHL/TA/GS  +DP/PT Pulses  Compartments compressible  Negative babinski  No grimace/pain response on dorsiflexion of the ankle Consult Orthopedics    Patient is a 82y Female who was admitted for sepsis/AMS requiring massive volume resuscitation. The patient received an IO in the Left Tibia last night. Per ICU the patient's IO displaced and between 1-2 L of fluid extravasated into the left lower extremity. The patient has a baseline dementia and is non-verbal at baseline.     HEALTH ISSUES - PROBLEM Dx:  Need for prophylactic measure: Need for prophylactic measure  Syncope, unspecified syncope type: Syncope, unspecified syncope type  Septic shock: Septic shock    MEDICATIONS  (STANDING):  enoxaparin Injectable 40 milliGRAM(s) SubCutaneous every 24 hours  piperacillin/tazobactam IVPB. 3.375 Gram(s) IV Intermittent every 8 hours  norepinephrine Infusion 0.1 MICROgram(s)/kG/Min IV Continuous <Continuous>  sodium bicarbonate  Infusion 0.228 mEq/kG/Hr IV Continuous <Continuous>  vasopressin Infusion 0.04 Unit(s)/Min IV Continuous <Continuous>    Allergies    No Known Allergies    Intolerances    PAST MEDICAL & SURGICAL HISTORY:  Aortic stenosis  Alzheimer disease  Meniscal injury: s/p arthroscopy                          6.9    21.5  )-----------( x        ( 2017 06:35 )             20.1       2017 06:35    147    |  112    |  24     ----------------------------<  164    3.7     |  17     |  1.40     Ca    7.5        2017 06:35  Phos  2.7       2017 06:35  Mg     1.8       2017 06:35    TPro  3.8    /  Alb  2.0    /  TBili  0.7    /  DBili  .10    /  AST  61     /  ALT  47     /  AlkPhos  43     2017 06:35      PT/INR - ( 2017 11:09 )   PT: 13.8 sec;   INR: 1.26 ratio         PTT - ( 2017 11:09 )  PTT:46.0 sec    Urinalysis Basic - ( 2017 01:07 )    Color: Yellow / Appearance: x / S.020 / pH: x  Gluc: x / Ketone: Trace  / Bili: Negative / Urobili: Negative   Blood: x / Protein: 25 mg/dL / Nitrite: Negative   Leuk Esterase: Negative / RBC: 3-5 /HPF / WBC 3-5   Sq Epi: x / Non Sq Epi: Few / Bacteria: Few      Vital Signs Last 24 Hrs  T(C): 36.8 (17 @ 04:01), Max: 37.1 (17 @ 09:50)  T(F): 98.2 (17 @ 04:01), Max: 98.8 (17 @ 09:50)  HR: 98 (17 @ 08:00) (81 - 126)  BP: 84/54 (17 @ 08:00) (70/39 - 161/54)  BP(mean): 64 (17 @ 08:00) (55 - 88)  RR: 22 (17 @ 08:00) (19 - 29)  SpO2: 94% (17 @ 08:00) (86% - 100%)    Gen: NAD    AVSS  Gen: NAD, non-responsive to verbal    Left Lower Extremity:  Noted diffuse swelling  Small poke hole noted to anterior left shin covered with gauze, no drainage  +EHL/FHL/TA/GS  Pulses palpable foot warm  Compartments compressible but swollen  Unable to assess compartments with passive stretch due to baseline mentation but gross ROM does not cause pain response.  RLE motor/sensation grossly intact unable to fully assess but withdrawing from pain  Unable to assess pain due to patient's baseline

## 2017-07-18 NOTE — CONSULT NOTE ADULT - PROBLEM SELECTOR RECOMMENDATION 2
consistent with infectious process at this point
Continue IV antibitotics,  on Vanco and Meropenem.  ID on case

## 2017-07-18 NOTE — CONSULT NOTE ADULT - ASSESSMENT
82 year old woman with baseline dementia brought by EMS for change in mental status today and found to be in septic shock.  Labs reviewed, pt seen and examined.  Found to have bilateral acetabular pelvic fractures, distended GB with GB wall thickening, no stones on ultrasound and negative Lawrence's sign.  Cannot rule out Acalculous cholecystitis.  HIDA scan would be helpful if pt's condition improves and would allow however if the GB is the presumed source of sepsis, in her current condition, on pressors and with NSTEMI, elevated Troponin Percutaneous Cholecystostomy can be offered.  If amenable this could be performed bedside by Interventional Radiology under sonographic guidance.

## 2017-07-18 NOTE — DISCHARGE NOTE ADULT - PATIENT PORTAL LINK FT
“You can access the [employer] Patient Portal, offered by [facility], by registering with the following website: [email or web address]”

## 2017-07-18 NOTE — CONSULT NOTE ADULT - SUBJECTIVE AND OBJECTIVE BOX
Pt seen and examined in ICU.  Minimally reactive to stimuli.  Currently on Pressors.  Vitals stable.    HPI as per record:  81 y/o F with PMHx of Alzheimer Dementia, aortic stenosis presented to the ED with syncope and hypotension. Patient lives at home with  and 24 hour aide Romy. Per aide, patient was getting a sponge bath when her arms became stiff. She was on the commode and began "shaking violently," and held her hand to her chest and . Patient is non-verbal so  and aide were unsure if she was in pain or this was an involuntary motion. Family states that her current level of consciousness is her baseline. Family states that she has been grinding her teeth for the last 6 months, and was grinding it more intensely on the day prior to admission. Patient is incontinent of urine at baseline, using diapers. Family denies patient being febrile. Had one episode of nonbloody, nonbilious vomiting in the ED. Of note, patient was on depakote for Alzheimer dementia and not for seizures. Has been off depakote for the last year.      In the ED, labs were significant for leukocytosis with WBC of 24.6, PT/INR of 13.8/1.26, aPTT of 46.0, Lactate of 5.0. Patient was afebrile but became hypotensive to 70/39. Given NS boluses x 5 and vanco/zosyn x 1. Had IO placed in left proximal tibia and started on levophed. EKG showed NSR at 66bpm, unchanged from prior study. CT head was negative for acute pathology, showed chronic brain findings c/w small vessel disease. CXR showed no acute pathology. Dr. Borjas (cardio) and Dr. Harris (ICU) evaluated patient. (2017 12:14)      PAST MEDICAL & SURGICAL HISTORY:  Aortic stenosis  Alzheimer disease  Meniscal injury: s/p arthroscopy      MEDICATIONS  (STANDING):  norepinephrine Infusion 0.1 MICROgram(s)/kG/Min (12.338 mL/Hr) IV Continuous <Continuous>  vasopressin Infusion 0.04 Unit(s)/Min (2.4 mL/Hr) IV Continuous <Continuous>  meropenem IVPB   IV Intermittent   meropenem IVPB 1000 milliGRAM(s) IV Intermittent every 12 hours  insulin lispro (HumaLOG) corrective regimen sliding scale   SubCutaneous every 6 hours  dextrose 5%. 1000 milliLiter(s) (50 mL/Hr) IV Continuous <Continuous>  sodium bicarbonate  Infusion 0.114 mEq/kG/Hr (50 mL/Hr) IV Continuous <Continuous>  dextrose 50% Injectable 12.5 Gram(s) IV Push once  dextrose 50% Injectable 25 Gram(s) IV Push once  dextrose 50% Injectable 25 Gram(s) IV Push once  vancomycin  IVPB   IV Intermittent     MEDICATIONS  (PRN):  acetaminophen  Suppository 650 milliGRAM(s) Rectal every 6 hours PRN For Temp greater than 38 C (100.4 F)  dextrose Gel 1 Dose(s) Oral once PRN Blood Glucose LESS THAN 70 milliGRAM(s)/deciliter  glucagon  Injectable 1 milliGRAM(s) IntraMuscular once PRN Glucose LESS THAN 70 milligrams/deciliter    Allergies:  No Known Allergies    FAMILY HISTORY:  Family history of MI (myocardial infarction)    Vital Signs Last 24 Hrs  T(C): 36.4 (2017 15:51), Max: 37 (2017 12:01)  T(F): 97.6 (2017 15:51), Max: 98.6 (2017 12:01)  HR: 99 (2017 20:35) (83 - 126)  BP: 98/53 (2017 20:35) (76/57 - 161/63)  BP(mean): 69 (2017 20:35) (59 - 91)  RR: 24 (2017 20:35) (19 - 30)  SpO2: 99% (2017 20:35) (88% - 100%)    LABS:                        8.0    30.2  )-----------( 70       ( 2017 09:04 )             23.0     07-18    146<H>  |  110<H>  |  25<H>  ----------------------------<  174<H>  3.6   |  19<L>  |  1.50<H>    Ca    7.7<L>      2017 09:04  Phos  2.7     07-18  Mg     1.8     07-18    TPro  4.7<L>  /  Alb  2.4<L>  /  TBili  0.9  /  DBili  x   /  AST  65<H>  /  ALT  55  /  AlkPhos  53      PT/INR - ( 2017 10:27 )   PT: 20.2 sec;   INR: 1.83 ratio         PTT - ( 2017 10:27 )  PTT:34.8 sec  Urinalysis Basic - ( 2017 01:07 )    Color: Yellow / Appearance: x / S.020 / pH: x  Gluc: x / Ketone: Trace  / Bili: Negative / Urobili: Negative   Blood: x / Protein: 25 mg/dL / Nitrite: Negative   Leuk Esterase: Negative / RBC: 3-5 /HPF / WBC 3-5   Sq Epi: x / Non Sq Epi: Few / Bacteria: Few        RADIOLOGY & ADDITIONAL STUDIES:    EXAM:  US ABDOMEN COMPLETE                            PROCEDURE DATE:  2017          INTERPRETATION:  CLINICAL INFORMATION: Right upper quadrant pain    COMPARISON: None available.    TECHNIQUE: Portable sonography of the abdomen.     FINDINGS:  The studies limited by the patient's body habitus.  Liver: Heterogeneous in echotexture which limits the detection for small   masses.  Bile ducts: Normal caliber. Common hepatic duct measures up to 9 mm   distally.   Gallbladder: There is marked wall thickening as well as trace   pericholecystic fluid. No gallstones identified. Sonographic Lawrence's   sign is negative.  Pancreas: Visualized portions are within normal limits.  Spleen: 4.8 cm. Within normal limits.  Right kidney: 9.5 cm. No hydronephrosis. There is a 1.5 cm cystic   structure in the upper pole which cannot be definitely characterized as a   simple cyst.  Left kidney: 8.5 cm. No hydronephrosis.   Ascites: None.  Aorta and IVC: Atherosclerotic changes of the aorta.  A small pleural effusion is partially visualized.    IMPRESSION:     Limited examination due to portable technique and patient body habitus.    Marked gallbladder wall thickening and trace pericholecystic fluid, which   may be reactive. Sonographic Lawrence sign was negative, however correlate   clinically for patient premedication.    1.5 cm hypodense lesion off the upper pole the right kidney which cannot   be definitely characterized as simple cyst.    Additional findings as above.      DANIEL HAMMER M.D., ATTENDING RADIOLOGIST  This document has been electronically signed. 2017 12:57PM          EXAM:  CT ABDOMEN AND PELVIS OC                            *** ADDENDUM 2017  ***    Please note, the body of the report contains a voice transcription error.   The corrected line should read as follows: Markedly distended gallbladder   with pericholecystic fluid      *** END OF ADDENDUM 2017  ***      PROCEDURE DATE:  2017          INTERPRETATION:  History: Septic shock.    CT abdomen and pelvis only oral contrast. Prior 2016.  Nasogastric tube in position. Small basilar effusions and dependent   atelectasis. Small pericardial effusion  Bladder markedly distended gallbladder without pericholecystic fluid.   Correlate with right upper quadrant ultrasound. No significant biliary   dilatation. Unenhanced liver spleen not remarkable. Atrophic pancreas.  No hydronephrosis or urolithiasis.  No suspicious adenopathy by CT size criteria.  Normal caliber abdominal aorta.  There is mild diffuse small bowel thickening consistent with nonspecific   enteritis. No bowel obstruction. Mild generalized ileus. No evidence of   appendicitis. No free air  There is a small amount of simple right upper quadrant ascites. Bladder   decompressed with Chandler.  Bilateral acute comminuted displaced acetabular fractures and left iliac   fracture. Bilateral associated obturator hematoma. There is moderate   hemorrhagic ascites/hematoma in the lower pelvis.  Mild anasarca.    Impression:    Markedly distended gallbladder with pericholecystic fluid. Correlate with   right upper quadrant ultrasound  Nonspecific diffuse enteritis.  Bilateral pelvic fractures as described with associated   hematoma/hemorrhagic ascites in the lower pelvis.      Discussed this case with Dr. Whalen prior to this dictation      ***Please see the addendum at the top of this report. It may contain   additional important information or changes.****      CHANCE MORGAN M.D., ATTENDING RADIOLOGIST  This document has been electronically signed. 2017  7:14PM  Addend:  CHANCE MORGAN M.D., ATTENDINGRADIOLOGIST  This addendum was electronically signed on: 2017  8:00PM.

## 2017-07-18 NOTE — PROGRESS NOTE ADULT - SUBJECTIVE AND OBJECTIVE BOX
Neurology follow up note    SUNIL [id number]      Interval History:    Patient resting in bed -- seen with family     MEDICATIONS    enoxaparin Injectable 40 milliGRAM(s) SubCutaneous every 24 hours  acetaminophen  Suppository 650 milliGRAM(s) Rectal every 6 hours PRN  norepinephrine Infusion 0.1 MICROgram(s)/kG/Min IV Continuous <Continuous>  vasopressin Infusion 0.04 Unit(s)/Min IV Continuous <Continuous>  meropenem IVPB   IV Intermittent   meropenem IVPB 1000 milliGRAM(s) IV Intermittent every 12 hours  insulin lispro (HumaLOG) corrective regimen sliding scale   SubCutaneous every 6 hours  dextrose 5%. 1000 milliLiter(s) IV Continuous <Continuous>  dextrose Gel 1 Dose(s) Oral once PRN  sodium bicarbonate  Infusion 0.114 mEq/kG/Hr IV Continuous <Continuous>  dextrose 50% Injectable 12.5 Gram(s) IV Push once  dextrose 50% Injectable 25 Gram(s) IV Push once  dextrose 50% Injectable 25 Gram(s) IV Push once  glucagon  Injectable 1 milliGRAM(s) IntraMuscular once PRN  vancomycin  IVPB   IV Intermittent   iohexol 300 mG (iodine)/mL Oral Solution 30 milliLiter(s) Oral once      Allergies    No Known Allergies    Intolerances        Height (cm): 162.6 (17 @ 23:24)    Vital Signs Last 24 Hrs  T(C): 36.5 (2017 08:01), Max: 36.8 (2017 04:01)  T(F): 97.7 (2017 08:01), Max: 98.2 (2017 04:01)  HR: 89 (2017 12:00) (89 - 126)  BP: 104/56 (2017 12:00) (75/47 - 127/61)  BP(mean): 73 (2017 12:00) (55 - 88)  RR: 24 (2017 12:00) (19 - 29)  SpO2: 97% (2017 12:00) (92% - 100%)      REVIEW OF SYSTEMS: Non Verbal       On Neurological Examination:    Mental Status - Patient is  Lethargic Unresponsive  .     Does not follow commands-- baseline wouldn't as per family     Speech -    Aphasia   Non Verbal                         Cranial Nerves - eye no gaze preference   smile symmetric  intact bilateral NLF    Motor Exam -   With stimuli positive movement bilateral upper and on movement bilateral lower extremities    Muscle tone - increased bilateral lower       GENERAL Exam: Nontoxic , No Acute Distress   	  HEENT:  normocephalic, atraumatic  		  LUNGS:  Decreased bilaterally  	  HEART: Normal S1S2   No murmur RRR        	  GI/ ABDOMEN:  Soft  Non tender    EXTREMITIES:   No Edema  No Clubbing  No Cyanosis No Edema                LABS:  CBC Full  -  ( 2017 09:04 )  WBC Count : 30.2 K/uL  Hemoglobin : 8.0 g/dL  Hematocrit : 23.0 %  Platelet Count - Automated : 70 K/uL  Mean Cell Volume : 95.6 fl  Mean Cell Hemoglobin : 33.2 pg  Mean Cell Hemoglobin Concentration : 34.7 gm/dL  Auto Neutrophil # : x  Auto Lymphocyte # : x  Auto Monocyte # : x  Auto Eosinophil # : x  Auto Basophil # : x  Auto Neutrophil % : x  Auto Lymphocyte % : x  Auto Monocyte % : x  Auto Eosinophil % : x  Auto Basophil % : x    Urinalysis Basic - ( 2017 01:07 )    Color: Yellow / Appearance: x / S.020 / pH: x  Gluc: x / Ketone: Trace  / Bili: Negative / Urobili: Negative   Blood: x / Protein: 25 mg/dL / Nitrite: Negative   Leuk Esterase: Negative / RBC: 3-5 /HPF / WBC 3-5   Sq Epi: x / Non Sq Epi: Few / Bacteria: Few          146<H>  |  110<H>  |  25<H>  ----------------------------<  174<H>  3.6   |  19<L>  |  1.50<H>    Ca    7.7<L>      2017 09:04  Phos  2.7     07-18  Mg     1.8     -18    TPro  4.7<L>  /  Alb  2.4<L>  /  TBili  0.9  /  DBili  x   /  AST  65<H>  /  ALT  55  /  AlkPhos  53  -18    Hemoglobin A1C:     LIVER FUNCTIONS - ( 2017 09:04 )  Alb: 2.4 g/dL / Pro: 4.7 g/dL / ALK PHOS: 53 U/L / ALT: 55 U/L / AST: 65 U/L / GGT: x           Vitamin B12   PT/INR - ( 2017 10:27 )   PT: 20.2 sec;   INR: 1.83 ratio         PTT - ( 2017 10:27 )  PTT:34.8 sec      RADIOLOGY    ANALYSIS AND PLAN:  An 82-year-old with episode of unresponsiveness and history of dementia.    1.	For episode of unresponsiveness, at present unclear etiology.  Suspect the patient has a septic shock type of component. patient is on 2 pressors  Also the patient's stiffening, questionable if this was a seizure event versus possibly secondary to cerebral hypoperfusion, leading to episode of stiffening no AED at present. No EEG technician  but patient does not appear to be in status.  Questionable cardiac events   2.	I would recommend sepsis workup, antibiotics as needed.  3.	For history of dementia, it appears to be advanced.  I will recommend supportive therapy.  4.	I will recommend if possible please maintain her systolic blood pressure above 100 to help maintain cerebral perfusion.  5.	At present secondary to labs, vitals on pressor overall prognosis appears to be poor   6.	Spoke with son, Timothy, at [phone].  Based on workup the patient may need additional testing.  We will continue to follow.    Thank you for the courtesy of consultation.    Plan of care was discussed with family. Questions answered.  Would continue to follow.  20 minutes spent on total encounter; more than 50% of the visit was spent counseling and/or coordinating care by the attending physician.

## 2017-07-18 NOTE — PROGRESS NOTE ADULT - SUBJECTIVE AND OBJECTIVE BOX
Patient is a 82y old  Female who presents with a chief complaint of Pt passed out at home (2017 15:56)      BRIEF HOSPITAL COURSE: 83 yo F with alzeimers dementia, aortic stenosis, presented after syncope with possible seizure at home, found to be in septic shock, started on antibiotics and given 5 liters of fluid.      Events last 24 hours: overnight became hypotensive, lactic acid increased to 13, Cr to 1.6, central line placed, given 3L LR, 2 amps bicarb.    PAST MEDICAL & SURGICAL HISTORY:  Aortic stenosis  Alzheimer disease  Meniscal injury: s/p arthroscopy      Review of Systems: cannot obtain, patient obtunded/nonverbal      Medications:  piperacillin/tazobactam IVPB. 3.375 Gram(s) IV Intermittent every 8 hours    norepinephrine Infusion 0.1 MICROgram(s)/kG/Min IV Continuous <Continuous>      acetaminophen  Suppository 650 milliGRAM(s) Rectal every 6 hours PRN      enoxaparin Injectable 40 milliGRAM(s) SubCutaneous every 24 hours          sodium bicarbonate  Infusion 0.228 mEq/kG/Hr IV Continuous <Continuous>  lactated ringers Bolus 3000 milliLiter(s) IV Bolus once                ICU Vital Signs Last 24 Hrs  T(C): 36.8 (2017 04:01), Max: 37.1 (2017 09:50)  T(F): 98.2 (2017 04:01), Max: 98.8 (2017 09:50)  HR: 104 (2017 05:00) (81 - 126)  BP: 87/53 (2017 04:01) (70/39 - 161/54)  BP(mean): 64 (2017 04:01) (55 - 88)  ABP: --  ABP(mean): --  RR: 24 (2017 05:00) (21 - 29)  SpO2: 97% (2017 05:00) (86% - 100%)      ABG - ( 2017 21:20 )  pH: 7.35  /  pCO2: 19    /  pO2: 62    / HCO3: 14    / Base Excess: -14.6 /  SaO2: 89                  I&O's Detail    2017 07:01  -  2017 05:21  --------------------------------------------------------  IN:    dextrose 5% + lactated ringers.: 375 mL    Lactated Ringers IV Bolus: 2000 mL    sodium bicarbonate  Infusion: 500 mL  Total IN: 2875 mL    OUT:  Total OUT: 0 mL    Total NET: 2875 mL            LABS:                        14.6   24.6  )-----------( 192      ( 2017 09:59 )             42.7     07-18    145  |  111<H>  |  25<H>  ----------------------------<  262<H>  3.7   |  15<L>  |  1.60<H>    Ca    7.7<L>      2017 03:29  Phos  2.6     07-18  Mg     1.7     18    TPro  6.2  /  Alb  3.2<L>  /  TBili  0.6  /  DBili  x   /  AST  44<H>  /  ALT  60  /  AlkPhos  107  07-17      CARDIAC MARKERS ( 2017 11:09 )  .053 ng/mL / x     / 133 U/L / x     / 1.8 ng/mL      CAPILLARY BLOOD GLUCOSE  246 (2017 03:00)        PT/INR - ( 2017 11:09 )   PT: 13.8 sec;   INR: 1.26 ratio         PTT - ( 2017 11:09 )  PTT:46.0 sec  Urinalysis Basic - ( 2017 01:07 )    Color: Yellow / Appearance: x / S.020 / pH: x  Gluc: x / Ketone: Trace  / Bili: Negative / Urobili: Negative   Blood: x / Protein: 25 mg/dL / Nitrite: Negative   Leuk Esterase: Negative / RBC: 3-5 /HPF / WBC 3-5   Sq Epi: x / Non Sq Epi: Few / Bacteria: Few      CULTURES:      Physical Exam  general: obtunded, moans to painful stimuli, otherwise not responsive  pulm: lungs CTA bilaterally, she is tachypnic but not distressed, breathing deep not shallow.   cardio: s1s2 regular rhythm . no murmurs  GI: abdomen is soft, she grimaces to palpation in left quadrants.   extremities: LLE with with inraosseous access is swollen, doesnt appear to be painful, she is moving her leg and her foot dopplerable posterior tibialis  : burns in place draining yellow urine  Skin: pale no diaphoresis  Neuro: obtunded, responds only to painful stimuli with moaning.   POCUS: IVC <1cm with 100% collapse, A lines no B lines on pulmonary exam. bilateral lung sliding    RADIOLOGY: CXR : central line in place    83 yo F with alzehmers dementia, aortic stenosis, p/w syncopal episode found to be septic, overnight became hypotensive, tachycardic, lactate 13, Cr elevated to 1.6, a central line was placed, CVP 0, she was given three liters LR and started on levophed. LLE swollen, concerning for infiltration of I/O access. She has dopplerable signal of posterior tibialis. Of note, at around 2:30am I/O nisa blood and flushed easily and leg was not edematous at that time.   - serial pulse/neurovascular checks LLE  - vascular surgery consult  - will remove I/O  - continue Zosyn  - follow up cultures  - Monitor CVP, improved to 7.   - titrate levophed to map 65-75, if becomes tachycardic from levo, switch to phenylephrine given reported hx of severe AS.   - trend lactic acid  - her abdomen is tender, ischemic colitis? may need CT scan when more stable  - strict I/Os, burns placed  -  was called overnight for central line consent, he was told her condition was worsening, came to hospital and made patient DNR/DNI,  -     CRITICAL CARE TIME SPENT: *** Patient is a 82y old  Female who presents with a chief complaint of Pt passed out at home (2017 15:56)      BRIEF HOSPITAL COURSE: 83 yo F with alzeimers dementia, aortic stenosis, presented after syncope with possible seizure at home, found to be in septic shock, started on antibiotics and given 5 liters of fluid.      Events last 24 hours: overnight became hypotensive, lactic acid increased to 13, Cr to 1.6, central line placed, given 3L LR, 2 amps bicarb.    PAST MEDICAL & SURGICAL HISTORY:  Aortic stenosis  Alzheimer disease  Meniscal injury: s/p arthroscopy      Review of Systems: cannot obtain, patient obtunded/nonverbal      Medications:  piperacillin/tazobactam IVPB. 3.375 Gram(s) IV Intermittent every 8 hours    norepinephrine Infusion 0.1 MICROgram(s)/kG/Min IV Continuous <Continuous>      acetaminophen  Suppository 650 milliGRAM(s) Rectal every 6 hours PRN      enoxaparin Injectable 40 milliGRAM(s) SubCutaneous every 24 hours          sodium bicarbonate  Infusion 0.228 mEq/kG/Hr IV Continuous <Continuous>  lactated ringers Bolus 3000 milliLiter(s) IV Bolus once                ICU Vital Signs Last 24 Hrs  T(C): 36.8 (2017 04:01), Max: 37.1 (2017 09:50)  T(F): 98.2 (2017 04:01), Max: 98.8 (2017 09:50)  HR: 104 (2017 05:00) (81 - 126)  BP: 87/53 (2017 04:01) (70/39 - 161/54)  BP(mean): 64 (2017 04:01) (55 - 88)  ABP: --  ABP(mean): --  RR: 24 (2017 05:00) (21 - 29)  SpO2: 97% (2017 05:00) (86% - 100%)      ABG - ( 2017 21:20 )  pH: 7.35  /  pCO2: 19    /  pO2: 62    / HCO3: 14    / Base Excess: -14.6 /  SaO2: 89                  I&O's Detail    2017 07:01  -  2017 05:21  --------------------------------------------------------  IN:    dextrose 5% + lactated ringers.: 375 mL    Lactated Ringers IV Bolus: 2000 mL    sodium bicarbonate  Infusion: 500 mL  Total IN: 2875 mL    OUT:  Total OUT: 0 mL    Total NET: 2875 mL            LABS:                        14.6   24.6  )-----------( 192      ( 2017 09:59 )             42.7     07-18    145  |  111<H>  |  25<H>  ----------------------------<  262<H>  3.7   |  15<L>  |  1.60<H>    Ca    7.7<L>      2017 03:29  Phos  2.6     07-18  Mg     1.7     18    TPro  6.2  /  Alb  3.2<L>  /  TBili  0.6  /  DBili  x   /  AST  44<H>  /  ALT  60  /  AlkPhos  107  07-17      CARDIAC MARKERS ( 2017 11:09 )  .053 ng/mL / x     / 133 U/L / x     / 1.8 ng/mL      CAPILLARY BLOOD GLUCOSE  246 (2017 03:00)        PT/INR - ( 2017 11:09 )   PT: 13.8 sec;   INR: 1.26 ratio         PTT - ( 2017 11:09 )  PTT:46.0 sec  Urinalysis Basic - ( 2017 01:07 )    Color: Yellow / Appearance: x / S.020 / pH: x  Gluc: x / Ketone: Trace  / Bili: Negative / Urobili: Negative   Blood: x / Protein: 25 mg/dL / Nitrite: Negative   Leuk Esterase: Negative / RBC: 3-5 /HPF / WBC 3-5   Sq Epi: x / Non Sq Epi: Few / Bacteria: Few      CULTURES:      Physical Exam  general: obtunded, moans to painful stimuli, otherwise not responsive  pulm: lungs CTA bilaterally, she is tachypnic but not distressed, breathing deep not shallow.   cardio: s1s2 regular rhythm . no murmurs  GI: abdomen is soft, she grimaces to palpation in left quadrants.   extremities: LLE with with inraosseous access is swollen, doesnt appear to be painful, she is moving her leg and her foot dopplerable posterior tibialis  : burns in place draining yellow urine  Skin: pale no diaphoresis  Neuro: obtunded, responds only to painful stimuli with moaning.   POCUS: IVC <1cm with 100% collapse, A lines no B lines on pulmonary exam. bilateral lung sliding    RADIOLOGY: CXR : central line in place    83 yo F with alzehmers dementia, aortic stenosis, p/w syncopal episode found to be septic, overnight became hypotensive, tachycardic, lactate 13, Cr elevated to 1.6, a central line was placed, CVP 0, she was given three liters LR and started on levophed. LLE swollen, concerning for infiltration of I/O access. She has dopplerable signal of posterior tibialis. Of note, at around 2:30am I/O nisa blood and flushed easily and leg was not edematous at that time.   - serial pulse/neurovascular checks LLE  - check CPK  - vascular surgery consult  - will remove I/O  - continue Zosyn  - follow up cultures  - Monitor CVP, improved to 7.   - titrate levophed to map 65-75, if becomes tachycardic from levo, switch to phenylephrine given reported hx of severe AS.   - trend lactic acid  - her abdomen is tender, ischemic colitis? may need CT scan when more stable  - strict I/Os, burns placed  -  was called overnight for central line consent, he was told her condition was worsening, came to hospital and made patient DNR/DNI,  -     CRITICAL CARE TIME SPENT: *** Patient is a 82y old  Female who presents with a chief complaint of Pt passed out at home (2017 15:56)      BRIEF HOSPITAL COURSE: 81 yo F with alzeimers dementia, aortic stenosis, presented after syncope with possible seizure at home, found to be in septic shock, started on antibiotics and given 5 liters of fluid.      Events last 24 hours: overnight became hypotensive, lactic acid increased to 13, Cr to 1.6, central line placed, given 3L LR, 2 amps bicarb.    PAST MEDICAL & SURGICAL HISTORY:  Aortic stenosis  Alzheimer disease  Meniscal injury: s/p arthroscopy      Review of Systems: cannot obtain, patient obtunded/nonverbal      Medications:  piperacillin/tazobactam IVPB. 3.375 Gram(s) IV Intermittent every 8 hours    norepinephrine Infusion 0.1 MICROgram(s)/kG/Min IV Continuous <Continuous>      acetaminophen  Suppository 650 milliGRAM(s) Rectal every 6 hours PRN      enoxaparin Injectable 40 milliGRAM(s) SubCutaneous every 24 hours          sodium bicarbonate  Infusion 0.228 mEq/kG/Hr IV Continuous <Continuous>  lactated ringers Bolus 3000 milliLiter(s) IV Bolus once                ICU Vital Signs Last 24 Hrs  T(C): 36.8 (2017 04:01), Max: 37.1 (2017 09:50)  T(F): 98.2 (2017 04:01), Max: 98.8 (2017 09:50)  HR: 104 (2017 05:00) (81 - 126)  BP: 87/53 (2017 04:01) (70/39 - 161/54)  BP(mean): 64 (2017 04:01) (55 - 88)  ABP: --  ABP(mean): --  RR: 24 (2017 05:00) (21 - 29)  SpO2: 97% (2017 05:00) (86% - 100%)      ABG - ( 2017 21:20 )  pH: 7.35  /  pCO2: 19    /  pO2: 62    / HCO3: 14    / Base Excess: -14.6 /  SaO2: 89                  I&O's Detail    2017 07:01  -  2017 05:21  --------------------------------------------------------  IN:    dextrose 5% + lactated ringers.: 375 mL    Lactated Ringers IV Bolus: 2000 mL    sodium bicarbonate  Infusion: 500 mL  Total IN: 2875 mL    OUT:  Total OUT: 0 mL    Total NET: 2875 mL            LABS:                        14.6   24.6  )-----------( 192      ( 2017 09:59 )             42.7     07-18    145  |  111<H>  |  25<H>  ----------------------------<  262<H>  3.7   |  15<L>  |  1.60<H>    Ca    7.7<L>      2017 03:29  Phos  2.6     07-18  Mg     1.7     18    TPro  6.2  /  Alb  3.2<L>  /  TBili  0.6  /  DBili  x   /  AST  44<H>  /  ALT  60  /  AlkPhos  107  07-17      CARDIAC MARKERS ( 2017 11:09 )  .053 ng/mL / x     / 133 U/L / x     / 1.8 ng/mL      CAPILLARY BLOOD GLUCOSE  246 (2017 03:00)        PT/INR - ( 2017 11:09 )   PT: 13.8 sec;   INR: 1.26 ratio         PTT - ( 2017 11:09 )  PTT:46.0 sec  Urinalysis Basic - ( 2017 01:07 )    Color: Yellow / Appearance: x / S.020 / pH: x  Gluc: x / Ketone: Trace  / Bili: Negative / Urobili: Negative   Blood: x / Protein: 25 mg/dL / Nitrite: Negative   Leuk Esterase: Negative / RBC: 3-5 /HPF / WBC 3-5   Sq Epi: x / Non Sq Epi: Few / Bacteria: Few      CULTURES:      Physical Exam  general: obtunded, moans to painful stimuli, otherwise not responsive  pulm: lungs CTA bilaterally, she is tachypnic but not distressed, breathing deep not shallow.   cardio: s1s2 regular rhythm . no murmurs  GI: abdomen is soft, she grimaces to palpation in left quadrants.   extremities: LLE with with inraosseous access is swollen, doesnt appear to be painful, she is moving her leg and her foot dopplerable posterior tibialis  : burns in place draining yellow urine  Skin: pale no diaphoresis  Neuro: obtunded, responds only to painful stimuli with moaning.   POCUS: IVC <1cm with 100% collapse, A lines no B lines on pulmonary exam. bilateral lung sliding    RADIOLOGY: CXR : central line in place    81 yo F with alzehmers dementia, aortic stenosis, p/w syncopal episode found to be septic, overnight became hypotensive, tachycardic, lactate 13, Cr elevated to 1.6, a central line was placed, CVP 0, she was given three liters LR and started on levophed. LLE swollen, concerning for infiltration of I/O access. She has dopplerable signal of posterior tibialis.  - serial pulse/neurovascular checks LLE  - ortho consult  - check CPK  - will remove I/O  - continue Zosyn  - follow up cultures  - Monitor CVP, improved to 7.   - titrate levophed to map 65-75, if becomes tachycardic from levo, switch to phenylephrine given reported hx of severe AS.   - trend lactic acid  - her abdomen is tender, ischemic colitis? may need CT scan when more stable  - strict I/Os, burns placed  -  was called overnight for central line consent, he was told her condition was worsening, came to hospital and made patient DNR/DNI,  -     CRITICAL CARE TIME SPENT: 60 min    (reviewing chart, discussing with family, discussing care with multidisciplinary team, at bedside evaluating and managing, not inclusive of procedures)

## 2017-07-18 NOTE — CONSULT NOTE ADULT - PROBLEM SELECTOR PROBLEM 4
Aortic valve stenosis, unspecified etiology
Alzheimer's dementia with behavioral disturbance, unspecified timing of dementia onset

## 2017-07-18 NOTE — CONSULT NOTE ADULT - SUBJECTIVE AND OBJECTIVE BOX
HPI:  81 y/o F with PMHx of Alzheimer Dementia, aortic stenosis presented to the ED with syncope and hypotension. Patient lives at home with  and 24 hour aide Romy. Per aide, patient was getting a sponge bath when her arms became stiff and extended. She was on the commode and began "shaking violently," and held her hand to her chest after the stiffness resolved. Patient is non-verbal so  and aide were unsure if she was in pain or this was an involuntary motion. Family states that her current level of consciousness is her baseline. Family states that she has been grinding her teeth for the last 6 months, and was grinding it more intensely on the day prior to admission. Patient is incontinent of urine at baseline, using diapers. Family denies patient being febrile. Had one episode of nonbloody, nonbilious vomiting in the ED. Of note, patient was on depakote for Alzheimer dementia and not for seizures. Has been off depakote for the last year.  reports no sick contacts and meticulous hand hygiene in the home.     In the ED, labs were significant for leukocytosis with WBC of 24.6, PT/INR of 13.8/1.26, aPTT of 46.0, Lactate of 5.0. Patient was afebrile but became hypotensive to 70/39. Given NS boluses x 5 and vanco/zosyn x 1. Had IO placed in left proximal tibia and started on levophed. EKG showed NSR at 66bpm, unchanged from prior study. CT head was negative for acute pathology, showed chronic brain findings c/w small vessel disease. CXR showed no acute pathology. Dr. Borjas (cardio) and Dr. Harris (ICU) evaluated patient. (2017 12:14)      PAST MEDICAL & SURGICAL HISTORY:  Aortic stenosis  Alzheimer disease  Meniscal injury: s/p arthroscopy      Antimicrobials  piperacillin/tazobactam IVPB. 3.375 Gram(s) IV Intermittent every 8 hours  Vanco IV      Immunological      Other  enoxaparin Injectable 40 milliGRAM(s) SubCutaneous every 24 hours  acetaminophen  Suppository 650 milliGRAM(s) Rectal every 6 hours PRN  norepinephrine Infusion 0.1 MICROgram(s)/kG/Min IV Continuous <Continuous>  sodium bicarbonate  Infusion 0.228 mEq/kG/Hr IV Continuous <Continuous>  vasopressin Infusion 0.04 Unit(s)/Min IV Continuous <Continuous>      Allergies    No Known Allergies    Intolerances        SOCIAL HISTORY: no current tobacco    FAMILY HISTORY:  Family history of MI (myocardial infarction)      ROS:    limited as pt is nonverbal    Vital Signs Last 24 Hrs  T(C): 36.5 (2017 08:01), Max: 37.1 (2017 09:50)  T(F): 97.7 (2017 08:01), Max: 98.8 (2017 09:50)  HR: 98 (2017 08:00) (81 - 126)  BP: 84/54 (2017 08:00) (70/39 - 161/54)  BP(mean): 64 (2017 08:00) (55 - 88)  RR: 22 (2017 08:00) (19 - 29)  SpO2: 94% (2017 08:00) (86% - 100%)    PE:  WDWN, pt not responding to questions or cooperating with exam  HEENT:  NC, PERRL, sclerae anicteric, conjunctivae clear, EOMI.  Sinuses nontender, no nasal exudate.    Neck:  Supple, no adenopathy, RT IJ line  Lungs:  No accessory muscle use, bilaterally clear to auscultation  Cor:  RRR, S1, S2, AS murmur appreciated  Abd:  Symmetric, normoactive BS.  Soft, pt grimacing and moaning to RUQ deep palpation, no masses, guarding or rebound.  Liver and spleen not enlarged  Extrem:  No cyanosis, noted edema of left leg but soft, not tense and clean ulcer left heal  Skin:  No rashes.  Neuro: grossly compromised  Musc: moving all limbs freely, no focal deficits    LABS:                        6.9    21.5  )-----------( 55       ( 2017 06:35 )             20.1     07-18    147<H>  |  112<H>  |  24<H>  ----------------------------<  164<H>  3.7   |  17<L>  |  1.40<H>    Ca    7.5<L>      2017 06:35  Phos  2.7     07-18  Mg     1.8     07-18    TPro  3.8<L>  /  Alb  2.0<L>  /  TBili  0.7  /  DBili  .10  /  AST  61<H>  /  ALT  47  /  AlkPhos  43  07-18    Urinalysis Basic - ( 2017 01:07 )    Color: Yellow / Appearance: x / S.020 / pH: x  Gluc: x / Ketone: Trace  / Bili: Negative / Urobili: Negative   Blood: x / Protein: 25 mg/dL / Nitrite: Negative   Leuk Esterase: Negative / RBC: 3-5 /HPF / WBC 3-5   Sq Epi: x / Non Sq Epi: Few / Bacteria: Few        MICROBIOLOGY:    Urine Microscopic-Add On (NC) (. @ 01:07)    Red Blood Cell - Urine: 3-5 /HPF    Epithelial Cells: Few    Bacteria: Few    Hyaline Casts: 0-2 /LPF    Comment - Urine: mod amorphous urates    White Blood Cell - Urine: 3-5        RADIOLOGY & ADDITIONAL STUDIES:    --

## 2017-07-18 NOTE — PROGRESS NOTE ADULT - SUBJECTIVE AND OBJECTIVE BOX
[facility] - Maisha Sosa, Lino, Dante, Indio Silverio Savella  Office Number:  [phone]    Critically ill, pale. Laying in bed, family at bedside. Currently on two pressors    Telemetry:  SR 90'-100's    MEDICATIONS  (STANDING):  enoxaparin Injectable 40 milliGRAM(s) SubCutaneous every 24 hours  norepinephrine Infusion 0.1 MICROgram(s)/kG/Min (12.338 mL/Hr) IV Continuous <Continuous>  vasopressin Infusion 0.04 Unit(s)/Min (2.4 mL/Hr) IV Continuous <Continuous>  meropenem IVPB   IV Intermittent   meropenem IVPB 1000 milliGRAM(s) IV Intermittent once  meropenem IVPB 1000 milliGRAM(s) IV Intermittent every 12 hours  insulin lispro (HumaLOG) corrective regimen sliding scale   SubCutaneous every 6 hours  dextrose 5%. 1000 milliLiter(s) (50 mL/Hr) IV Continuous <Continuous>  sodium bicarbonate  Infusion 0.114 mEq/kG/Hr (50 mL/Hr) IV Continuous <Continuous>  dextrose 50% Injectable 12.5 Gram(s) IV Push once  dextrose 50% Injectable 25 Gram(s) IV Push once  dextrose 50% Injectable 25 Gram(s) IV Push once    MEDICATIONS  (PRN):  acetaminophen  Suppository 650 milliGRAM(s) Rectal every 6 hours PRN For Temp greater than 38 C (100.4 F)  dextrose Gel 1 Dose(s) Oral once PRN Blood Glucose LESS THAN 70 milliGRAM(s)/deciliter  glucagon  Injectable 1 milliGRAM(s) IntraMuscular once PRN Glucose LESS THAN 70 milligrams/deciliter      Allergies    No Known Allergies    Intolerances        Vital Signs Last 24 Hrs  T(C): 36.5 (18 Jul 2017 08:01), Max: 36.8 (18 Jul 2017 04:01)  T(F): 97.7 (18 Jul 2017 08:01), Max: 98.2 (18 Jul 2017 04:01)  HR: 99 (18 Jul 2017 10:00) (81 - 126)  BP: 103/55 (18 Jul 2017 10:00) (75/47 - 161/54)  BP(mean): 73 (18 Jul 2017 10:00) (55 - 88)  RR: 23 (18 Jul 2017 10:00) (19 - 29)  SpO2: 94% (18 Jul 2017 10:00) (92% - 100%)    I&O's Summary    17 Jul 2017 07:01  -  18 Jul 2017 07:00  --------------------------------------------------------  IN: 3391.5 mL / OUT: 170 mL / NET: 3221.5 mL    18 Jul 2017 07:01  -  18 Jul 2017 11:07  --------------------------------------------------------  IN: 473 mL / OUT: 60 mL / NET: 413 mL        ON EXAM:    General: Lethargic, in bed.  HEENT: Mucous membranes are dry, pale  Lungs: Decreased breath sounds bilaterally anteriorly. No wheezing, rales or rhonchi  Cardiovascular: Regular, S1 and S2,+ SM III/VI at RUSB,  rubs, or gallops  Gastrointestinal: Bowel Sounds present, soft, nontender.   Lymph: No peripheral edema. No lymphadenopathy.  Skin: No rashes or ulcers  Psych:  Lethargic, unable to assess    LABS: All Labs Reviewed:                        8.0    30.2  )-----------( 70       ( 18 Jul 2017 09:04 )             23.0                         6.9    21.5  )-----------( 55       ( 18 Jul 2017 06:35 )             20.1                         14.6   24.6  )-----------( 192      ( 17 Jul 2017 09:59 )             42.7     18 Jul 2017 09:04    146    |  110    |  25     ----------------------------<  174    3.6     |  19     |  1.50   18 Jul 2017 06:35    147    |  112    |  24     ----------------------------<  164    3.7     |  17     |  1.40   18 Jul 2017 03:29    145    |  111    |  25     ----------------------------<  262    3.7     |  15     |  1.60     Ca    7.7        18 Jul 2017 09:04  Ca    7.5        18 Jul 2017 06:35  Ca    7.7        18 Jul 2017 03:29  Phos  2.7       18 Jul 2017 06:35  Phos  2.6       18 Jul 2017 03:29  Mg     1.8       18 Jul 2017 06:35  Mg     1.7       18 Jul 2017 03:29    TPro  4.7    /  Alb  2.4    /  TBili  0.9    /  DBili  x      /  AST  x      /  ALT  55     /  AlkPhos  53     18 Jul 2017 09:04  TPro  3.8    /  Alb  2.0    /  TBili  0.7    /  DBili  .10    /  AST  61     /  ALT  47     /  AlkPhos  43     18 Jul 2017 06:35  TPro  6.2    /  Alb  3.2    /  TBili  0.6    /  DBili  x      /  AST  44     /  ALT  60     /  AlkPhos  107    17 Jul 2017 11:07    PT/INR - ( 18 Jul 2017 10:27 )   PT: 20.2 sec;   INR: 1.83 ratio         PTT - ( 18 Jul 2017 10:27 )  PTT:34.8 sec  CARDIAC MARKERS ( 18 Jul 2017 06:35 )  3.560 ng/mL / x     / 509 U/L / x     / 17.8 ng/mL  CARDIAC MARKERS ( 17 Jul 2017 11:09 )  .053 ng/mL / x     / 133 U/L / x     / 1.8 ng/mL      Blood Culture:

## 2017-07-18 NOTE — DIETITIAN INITIAL EVALUATION ADULT. - OTHER INFO
patient noted now NPO with DNR DNI . per family for last year was taking foods blenderized drinking ensure and protein supplement weight loss prior to change in diet then weight gain and stable per family.

## 2017-07-18 NOTE — CONSULT NOTE ADULT - PROBLEM SELECTOR RECOMMENDATION 9
The only localization in this patient with unremarkable UA, clear CXR and nonverbal status in abd RUQ.  With leukocytosis, elevated lactate and current status recommend:    change abx from Zosyn to Meropenem, Vanco dosed based on levels  consider abd imaging
Continue ICU management and resuscitation.

## 2017-07-18 NOTE — DISCHARGE NOTE ADULT - NS AS DC STROKE ED MATERIALS
Advanced age is a risk factor for Strokes/Need for Followup After Discharge/Call 911 for Stroke/Risk Factors for Stroke/Stroke Education Booklet/Prescribed Medications/Stroke Warning Signs and Symptoms

## 2017-07-18 NOTE — CONSULT NOTE ADULT - PROBLEM SELECTOR RECOMMENDATION 3
limiting history
Negative Lawrence's sign and Normal LFT's.  If no other source of sepsis may benefit from Percutaneous drainage (POOR surgical candidate).  Would consider HIDA scan if pt stable enough for Nuc Study (unlikely given sepsis and pressors)

## 2017-07-18 NOTE — PROCEDURE NOTE - ADDITIONAL PROCEDURE DETAILS
consent was obtained from . Risks including bleeding, infection, thrombosis, arterial puncture, stroke, air embolism and death explained, benefits explained, questions answered    under sterile conditions, using seldinger technique with ultrasound for guidance, a RIJ central line was placed over wire, wire was removed.  1% lido used. all ports nisa blood and flushed easily.  like was sutured into place at 14cm, sterile dressing with biopatch applied.  no complications, pt tolerated procedure well, CXR confirmed placemement    procedure time exclusive of critical care time

## 2017-07-18 NOTE — CONSULT NOTE ADULT - PROBLEM SELECTOR RECOMMENDATION 4
patient appears to be tolerating fluid resuscitation and ICU support, will follow    Thank you for consulting us and involving us in the management of this most interesting and challenging case.     We will follow along in the care of this patient.
Neurology on case

## 2017-07-19 LAB
ALBUMIN SERPL ELPH-MCNC: 2.4 G/DL — LOW (ref 3.3–5)
ALP SERPL-CCNC: 66 U/L — SIGNIFICANT CHANGE UP (ref 40–120)
ALT FLD-CCNC: 60 U/L — SIGNIFICANT CHANGE UP (ref 12–78)
ANION GAP SERPL CALC-SCNC: 9 MMOL/L — SIGNIFICANT CHANGE UP (ref 5–17)
AST SERPL-CCNC: 99 U/L — HIGH (ref 15–37)
BILIRUB SERPL-MCNC: 1.2 MG/DL — SIGNIFICANT CHANGE UP (ref 0.2–1.2)
BUN SERPL-MCNC: 31 MG/DL — HIGH (ref 7–23)
CALCIUM SERPL-MCNC: 7.9 MG/DL — LOW (ref 8.5–10.1)
CHLORIDE SERPL-SCNC: 103 MMOL/L — SIGNIFICANT CHANGE UP (ref 96–108)
CO2 SERPL-SCNC: 31 MMOL/L — SIGNIFICANT CHANGE UP (ref 22–31)
CREAT ?TM UR-MCNC: 146 MG/DL — SIGNIFICANT CHANGE UP
CREAT SERPL-MCNC: 1 MG/DL — SIGNIFICANT CHANGE UP (ref 0.5–1.3)
CULTURE RESULTS: NO GROWTH — SIGNIFICANT CHANGE UP
GLUCOSE SERPL-MCNC: 139 MG/DL — HIGH (ref 70–99)
HBA1C BLD-MCNC: 5.4 % — SIGNIFICANT CHANGE UP (ref 4–5.6)
HCT VFR BLD CALC: 30.1 % — LOW (ref 34.5–45)
HGB BLD-MCNC: 10.7 G/DL — LOW (ref 11.5–15.5)
LYMPHOCYTES # BLD AUTO: 6 % — LOW (ref 13–44)
MAGNESIUM SERPL-MCNC: 1.9 MG/DL — SIGNIFICANT CHANGE UP (ref 1.6–2.6)
MCHC RBC-ENTMCNC: 32 PG — SIGNIFICANT CHANGE UP (ref 27–34)
MCHC RBC-ENTMCNC: 35.5 GM/DL — SIGNIFICANT CHANGE UP (ref 32–36)
MCV RBC AUTO: 90.3 FL — SIGNIFICANT CHANGE UP (ref 80–100)
MONOCYTES NFR BLD AUTO: 5 % — SIGNIFICANT CHANGE UP (ref 1–9)
NEUTROPHILS NFR BLD AUTO: 82 % — HIGH (ref 43–77)
PHOSPHATE SERPL-MCNC: 2.4 MG/DL — LOW (ref 2.5–4.5)
PLATELET # BLD AUTO: 50 K/UL — LOW (ref 150–400)
POTASSIUM SERPL-MCNC: 3.5 MMOL/L — SIGNIFICANT CHANGE UP (ref 3.5–5.3)
POTASSIUM SERPL-SCNC: 3.5 MMOL/L — SIGNIFICANT CHANGE UP (ref 3.5–5.3)
PROT SERPL-MCNC: 4.7 G/DL — LOW (ref 6–8.3)
RBC # BLD: 3.33 M/UL — LOW (ref 3.8–5.2)
RBC # FLD: 13.4 % — SIGNIFICANT CHANGE UP (ref 10.3–14.5)
SODIUM SERPL-SCNC: 143 MMOL/L — SIGNIFICANT CHANGE UP (ref 135–145)
SODIUM UR-SCNC: 21 MMOL/L — SIGNIFICANT CHANGE UP
SPECIMEN SOURCE: SIGNIFICANT CHANGE UP
WBC # BLD: 23.3 K/UL — HIGH (ref 3.8–10.5)
WBC # FLD AUTO: 23.3 K/UL — HIGH (ref 3.8–10.5)

## 2017-07-19 PROCEDURE — 99292 CRITICAL CARE ADDL 30 MIN: CPT

## 2017-07-19 PROCEDURE — 99291 CRITICAL CARE FIRST HOUR: CPT

## 2017-07-19 PROCEDURE — 99232 SBSQ HOSP IP/OBS MODERATE 35: CPT

## 2017-07-19 RX ORDER — MORPHINE SULFATE 50 MG/1
2 CAPSULE, EXTENDED RELEASE ORAL EVERY 4 HOURS
Qty: 0 | Refills: 0 | Status: DISCONTINUED | OUTPATIENT
Start: 2017-07-19 | End: 2017-07-21

## 2017-07-19 RX ADMIN — VASOPRESSIN 2.4 UNIT(S)/MIN: 20 INJECTION INTRAVENOUS at 05:43

## 2017-07-19 RX ADMIN — Medication 12.34 MICROGRAM(S)/KG/MIN: at 05:43

## 2017-07-19 RX ADMIN — MEROPENEM 200 MILLIGRAM(S): 1 INJECTION INTRAVENOUS at 17:43

## 2017-07-19 RX ADMIN — Medication 50 MEQ/KG/HR: at 05:43

## 2017-07-19 RX ADMIN — MEROPENEM 200 MILLIGRAM(S): 1 INJECTION INTRAVENOUS at 05:42

## 2017-07-19 NOTE — PROGRESS NOTE ADULT - SUBJECTIVE AND OBJECTIVE BOX
[facility] - Maisha Sosa, Dante Huynh Pannella, Patel  Office Number:  [phone]    Patient resting in bed in NAD.  Remains on IV pressor support.  Had CT that showed GB wall thickening.  For percutaneous bart tube today.  S/p 2 units PRBC overnight.    Telemetry:  Normal sinus rhythm.  PVCs.    MEDICATIONS  (STANDING):  norepinephrine Infusion 0.1 MICROgram(s)/kG/Min (12.338 mL/Hr) IV Continuous <Continuous>  vasopressin Infusion 0.04 Unit(s)/Min (2.4 mL/Hr) IV Continuous <Continuous>  meropenem IVPB   IV Intermittent   meropenem IVPB 1000 milliGRAM(s) IV Intermittent every 12 hours  insulin lispro (HumaLOG) corrective regimen sliding scale   SubCutaneous every 6 hours  dextrose 5%. 1000 milliLiter(s) (50 mL/Hr) IV Continuous <Continuous>  sodium bicarbonate  Infusion 0.114 mEq/kG/Hr (50 mL/Hr) IV Continuous <Continuous>  dextrose 50% Injectable 12.5 Gram(s) IV Push once  dextrose 50% Injectable 25 Gram(s) IV Push once  dextrose 50% Injectable 25 Gram(s) IV Push once  vancomycin  IVPB 1000 milliGRAM(s) IV Intermittent every 24 hours  vancomycin  IVPB   IV Intermittent     MEDICATIONS  (PRN):  acetaminophen  Suppository 650 milliGRAM(s) Rectal every 6 hours PRN For Temp greater than 38 C (100.4 F)  dextrose Gel 1 Dose(s) Oral once PRN Blood Glucose LESS THAN 70 milliGRAM(s)/deciliter  glucagon  Injectable 1 milliGRAM(s) IntraMuscular once PRN Glucose LESS THAN 70 milligrams/deciliter      Allergies    No Known Allergies        Vital Signs Last 24 Hrs  T(C): 36.5 (19 Jul 2017 04:01), Max: 37 (18 Jul 2017 12:01)  T(F): 97.7 (19 Jul 2017 04:01), Max: 98.6 (18 Jul 2017 12:01)  HR: 80 (19 Jul 2017 05:30) (68 - 111)  BP: 107/55 (19 Jul 2017 05:00) (78/54 - 161/63)  BP(mean): 74 (19 Jul 2017 05:00) (60 - 91)  RR: 19 (19 Jul 2017 05:30) (16 - 30)  SpO2: 100% (19 Jul 2017 05:30) (88% - 100%)    I&O's Summary    17 Jul 2017 07:01  -  18 Jul 2017 07:00  --------------------------------------------------------  IN: 3391.5 mL / OUT: 170 mL / NET: 3221.5 mL    18 Jul 2017 07:01  -  19 Jul 2017 05:55  --------------------------------------------------------  IN: 4078.5 mL / OUT: 620 mL / NET: 3458.5 mL        ON EXAM:    General: NAD, awake and alert  HEENT: Mucous membranes are dry, anicteric.  NGT in place.  Right CVC.  Lungs: Non-labored, breath sounds are clear bilaterally, No wheezing, rales or rhonchi  Cardiovascular: Regular, S1.  3/6 harsh systolic murmur.  Gastrointestinal: Bowel Sounds present, soft, nontender.   Lymph: Trace peripheral edema. No lymphadenopathy.  Skin: No rashes or ulcers    LABS: All Labs Reviewed:                        8.0    30.2  )-----------( 70       ( 18 Jul 2017 09:04 )             23.0                         6.9    21.5  )-----------( 55       ( 18 Jul 2017 06:35 )             20.1                         14.6   24.6  )-----------( 192      ( 17 Jul 2017 09:59 )             42.7     18 Jul 2017 09:04    146    |  110    |  25     ----------------------------<  174    3.6     |  19     |  1.50   18 Jul 2017 06:35    147    |  112    |  24     ----------------------------<  164    3.7     |  17     |  1.40   18 Jul 2017 03:29    145    |  111    |  25     ----------------------------<  262    3.7     |  15     |  1.60     Ca    7.7        18 Jul 2017 09:04  Ca    7.5        18 Jul 2017 06:35  Ca    7.7        18 Jul 2017 03:29  Phos  2.7       18 Jul 2017 06:35  Phos  2.6       18 Jul 2017 03:29  Mg     1.8       18 Jul 2017 06:35  Mg     1.7       18 Jul 2017 03:29    TPro  4.7    /  Alb  2.4    /  TBili  0.9    /  DBili  x      /  AST  65     /  ALT  55     /  AlkPhos  53     18 Jul 2017 09:04  TPro  3.8    /  Alb  2.0    /  TBili  0.7    /  DBili  .10    /  AST  61     /  ALT  47     /  AlkPhos  43     18 Jul 2017 06:35  TPro  6.2    /  Alb  3.2    /  TBili  0.6    /  DBili  x      /  AST  44     /  ALT  60     /  AlkPhos  107    17 Jul 2017 11:07    PT/INR - ( 18 Jul 2017 10:27 )   PT: 20.2 sec;   INR: 1.83 ratio         PTT - ( 18 Jul 2017 10:27 )  PTT:34.8 sec  CARDIAC MARKERS ( 18 Jul 2017 22:29 )  2.460 ng/mL / x     / 1498 U/L / x     / 27.1 ng/mL  CARDIAC MARKERS ( 18 Jul 2017 14:04 )  3.520 ng/mL / x     / 1151 U/L / x     / 31.2 ng/mL  CARDIAC MARKERS ( 18 Jul 2017 06:35 )  3.560 ng/mL / x     / 509 U/L / x     / 17.8 ng/mL  CARDIAC MARKERS ( 17 Jul 2017 11:09 )  .053 ng/mL / x     / 133 U/L / x     / 1.8 ng/mL

## 2017-07-19 NOTE — PROGRESS NOTE ADULT - ASSESSMENT
A/P: 82y Female with B/l acetabular fracture  Pain control  NWB B/L LE, bedrest  FU labs/imaging- dedicated CT pelvis, Judet/inlet/outlet Xrays  c/w ICU management and care  DVT ppx  No acute orthopedic surgical intervention at this time A/P: 82y Female with B/l acetabular fracture, fluid extravasation in LLE improved  Pain control  NWB B/L LE, bedrest  FU labs/imaging- dedicated CT pelvis, Judet/inlet/outlet Xrays  c/w ICU management and care  fluid extravasation improving no signs of compartment syndrome monitor for progression to cellulitis/infection w/ history of sepsis  DVT ppx  No acute orthopedic surgical intervention at this time

## 2017-07-19 NOTE — PROGRESS NOTE ADULT - ATTENDING COMMENTS
82F [facility] Advanced Alzheimer's dementia (nonverbal, fully dependent on ADL's) and severe aortic stenosis presents with episode of syncope/possible seizure with septic shock suspected from acalculous cholecystitis, complicated by encephalopathy, ASHLEY, severe lactic acidosis, NSTEMI, and now found to have b/l acetabular fractures.    --encephalopathy likely related to sepsis, uremia, acidemia  no evidence of current seizure  --septic shock, remains on low dose levophed and vasopressin  demand ischemia in setting of multiorgan failure, biomarkers downtrending  --stable respiratory status for now  --ASHLEY improving, likely prerenal   acidemia improved, d/c NaHCO3  --suspected acalculous cholecystitis, not an operative candidate, only possibility perc cholecystostomy tube  --continue meropenem, d/c vanc  --anemia likely dilutional component and acute blood loss due to fractures, appropriate response to 2 PRBCs overnight  --b/l acetabular fractures, non-operative management, ? from fall v seizure  --family meeting with , son and daughter.  If pt were to survive this acute illness she would likely be bedbound due to acetabular fractures, and living in NH.  They all agree this would not be acceptable quality of life to pt.  They would like to remove life support and continue comfort measures only.  They will discuss with remainder of family and let us know when ready to remove life support.    --pt critically ill, CC time 90min

## 2017-07-19 NOTE — PROGRESS NOTE ADULT - SUBJECTIVE AND OBJECTIVE BOX
Neurology follow up note    SUNIL [id number]      Interval History:    Patient seen with family  resting in bed     MEDICATIONS    acetaminophen  Suppository 650 milliGRAM(s) Rectal every 6 hours PRN  norepinephrine Infusion 0.1 MICROgram(s)/kG/Min IV Continuous <Continuous>  vasopressin Infusion 0.04 Unit(s)/Min IV Continuous <Continuous>  meropenem IVPB   IV Intermittent   meropenem IVPB 1000 milliGRAM(s) IV Intermittent every 12 hours  insulin lispro (HumaLOG) corrective regimen sliding scale   SubCutaneous every 6 hours  dextrose 5%. 1000 milliLiter(s) IV Continuous <Continuous>  dextrose Gel 1 Dose(s) Oral once PRN  dextrose 50% Injectable 12.5 Gram(s) IV Push once  dextrose 50% Injectable 25 Gram(s) IV Push once  dextrose 50% Injectable 25 Gram(s) IV Push once  glucagon  Injectable 1 milliGRAM(s) IntraMuscular once PRN  morphine  - Injectable 2 milliGRAM(s) IV Push every 4 hours PRN      Allergies    No Known Allergies    Intolerances            Vital Signs Last 24 Hrs  T(C): 37.1 (2017 12:01), Max: 37.1 (2017 12:01)  T(F): 98.8 (2017 12:01), Max: 98.8 (2017 12:01)  HR: 67 (2017 15:00) (60 - 101)  BP: 104/56 (2017 15:00) (89/52 - 161/63)  BP(mean): 75 (2017 15:00) (66 - 91)  RR: 16 (2017 15:00) (15 - 26)  SpO2: 96% (2017 15:00) (94% - 100%)       REVIEW OF SYSTEMS: Non Verbal       On Neurological Examination:    Mental Status - Patient is  Lethargic Unresponsive  .     Does not follow commands-- baseline wouldn't as per family     Speech -    Aphasia   Non Verbal                         Cranial Nerves - eye no gaze preference   smile symmetric  intact bilateral NLF    Motor Exam -   With stimuli positive movement bilateral upper and on movement bilateral lower extremities slight hand grasp     Muscle tone - increased bilateral lower       GENERAL Exam: Nontoxic , No Acute Distress   	  HEENT:  normocephalic, atraumatic  		  LUNGS:  Decreased bilaterally  	  HEART: Normal S1S2   No murmur RRR        	  GI/ ABDOMEN:  Soft  Non tender    EXTREMITIES:   No Edema  No Clubbing  No Cyanosis No Edema         LABS:  CBC Full  -  ( 2017 06:37 )  WBC Count : 23.3 K/uL  Hemoglobin : 10.7 g/dL  Hematocrit : 30.1 %  Platelet Count - Automated : 50 K/uL  Mean Cell Volume : 90.3 fl  Mean Cell Hemoglobin : 32.0 pg  Mean Cell Hemoglobin Concentration : 35.5 gm/dL  Auto Neutrophil # : x  Auto Lymphocyte # : x  Auto Monocyte # : x  Auto Eosinophil # : x  Auto Basophil # : x  Auto Neutrophil % : 82.0 %  Auto Lymphocyte % : 6.0 %  Auto Monocyte % : 5.0 %  Auto Eosinophil % : x  Auto Basophil % : x    Urinalysis Basic - ( 2017 01:07 )    Color: Yellow / Appearance: x / S.020 / pH: x  Gluc: x / Ketone: Trace  / Bili: Negative / Urobili: Negative   Blood: x / Protein: 25 mg/dL / Nitrite: Negative   Leuk Esterase: Negative / RBC: 3-5 /HPF / WBC 3-5   Sq Epi: x / Non Sq Epi: Few / Bacteria: Few          143  |  103  |  31<H>  ----------------------------<  139<H>  3.5   |  31  |  1.00    Ca    7.9<L>      2017 06:37  Phos  2.4       Mg     1.9         TPro  4.7<L>  /  Alb  2.4<L>  /  TBili  1.2  /  DBili  x   /  AST  99<H>  /  ALT  60  /  AlkPhos  66      Hemoglobin A1C: Hemoglobin A1C, Whole Blood: 5.4 % ( @ 08:50)      LIVER FUNCTIONS - ( 2017 06:37 )  Alb: 2.4 g/dL / Pro: 4.7 g/dL / ALK PHOS: 66 U/L / ALT: 60 U/L / AST: 99 U/L / GGT: x           Vitamin B12   PT/INR - ( 2017 10:27 )   PT: 20.2 sec;   INR: 1.83 ratio         PTT - ( 2017 10:27 )  PTT:34.8 sec      RADIOLOGY    ANALYSIS AND PLAN:  An 82-year-old with episode of unresponsiveness and history of dementia.    1.	For episode of unresponsiveness, at present unclear etiology.  Suspect the patient has a septic shock type of component. patient is on 2 pressors  Also the patient's stiffening, questionable if this was a seizure event versus possibly secondary to cerebral hypoperfusion, leading to episode of stiffening no AED at present. No EEG technician  but patient does not appear to be in status.  Questionable cardiac events   2.	I would recommend sepsis workup, antibiotics as needed.  3.	For history of dementia, it appears to be advanced.  I will recommend supportive therapy.  4.	I will recommend if possible please maintain her systolic blood pressure above 100 to help maintain cerebral perfusion.  5.	At present secondary to labs, vitals on pressor overall prognosis appears to be poor   6.	also with bilateral fractures patient overall long term prognosis  may be poor   7.	Spoke with  and daughter at bedside----- Timothy, at [phone].  Based on workup the patient may need additional testing.  We will continue to follow.    Thank you for the courtesy of consultation.    Plan of care was discussed with family. Questions answered.  Would continue to follow.  20 minutes

## 2017-07-19 NOTE — PROGRESS NOTE ADULT - ASSESSMENT
81 y/o F with PMHx of Alzheimer Dementia, severe aortic stenosis, presented to the ED with syncope and hypotension, now with sepsis with multiorgan involvement and hypoperfusion, GB thought to be the source, for potential percutaneous cholecystostomy tube:    - Preliminary echo shows normal LV function, severe AS, and severe pulmonary hypertension  - Patient remains on IV pressor support.  Continue for now.  - CE trend could possibly be related to ischemia.  CK and MB out of proportion for an isolated coronary etiology, but troponin trend could be consistent with a small NSTEMI.    - Aspirin stopped for hemorrhagic ascites seen on abdominal/pelvic CT  - Abx per primary for septic shock  - s/p 2 units PRBC overnight.  Follow H/H.  - No evidence of significant volume overload or uncontrolled arrhythmia.  Monitor closely for ADHF given severe AS  - Monitor and replete electrolytes. Keep K>4.0 and Mg>2.0.   - Further cardiac workup will depend on clinical course.   - All other workup per primary team. Will followup.   - Patient at high risk for periprocedural cardiac complications given clinical condition and comorbidities  - Pt with poor overall long term prognosis  - To follow with you  - Patient at high risk for decompensation. Critically ill. >35 minutes of critical care time was spent with this patient.

## 2017-07-19 NOTE — PROGRESS NOTE ADULT - SUBJECTIVE AND OBJECTIVE BOX
Interval events: Status unchanged overnight, found to have acalculous cholecystitis on abdominal CT with incidental finding of bilateral acetabular fractures and iliac fracture with hematoma.  Per  patient is less alert than at baseline.   DNR/DNI    Review of Systems:  Unable to assess due to patient's baseline status.     T(F): 97.7 (17 @ 04:01), Max: 98.6 (17 @ 12:01)  HR: 66 (17 @ 07:30) (65 - 108)  BP: 110/53 (17 @ 07:30) (79/51 - 161/63)  RR: 18 (17 @ 07:30) (16 - 30)  SpO2: 96% (17 @ 07:30) (88% - 100%)  Wt(kg): --        CAPILLARY BLOOD GLUCOSE  128 (2017 06:00)        I&O's Summary    2017 07:01  -  2017 07:00  --------------------------------------------------------  IN: 4243.8 mL / OUT: 1090 mL / NET: 3153.8 mL        Physical Exam:     Gen: minimally responsive to pain, not responsive to verbal,   Neuro: eyes open spontaneously, nothing verbal  HEENT: PERRLA, mucous membranes moist  CV: Regular rate and rhythm, systolic ejection murmur,   Pulm: CTAB, no wheezes rhonchi or rales  GI: abdomen soft, non-distended  Ext: moving all extremities spontaneously, swelling decreased on LLE  Skin: extensive bruising noted to bilateral UE    Meds:    meropenem IVPB   IV Intermittent   meropenem IVPB 1000 milliGRAM(s) IV Intermittent every 12 hours  vancomycin  IVPB 1000 milliGRAM(s) IV Intermittent every 24 hours  vancomycin  IVPB   IV Intermittent   norepinephrine Infusion 0.1 MICROgram(s)/kG/Min IV Continuous <Continuous>  vasopressin Infusion 0.04 Unit(s)/Min IV Continuous <Continuous>  insulin lispro (HumaLOG) corrective regimen sliding scale   SubCutaneous every 6 hours  dextrose Gel 1 Dose(s) Oral once PRN  dextrose 50% Injectable 12.5 Gram(s) IV Push once  dextrose 50% Injectable 25 Gram(s) IV Push once  dextrose 50% Injectable 25 Gram(s) IV Push once  glucagon  Injectable 1 milliGRAM(s) IntraMuscular once PRN  acetaminophen  Suppository 650 milliGRAM(s) Rectal every 6 hours PRN  dextrose 5%. 1000 milliLiter(s) IV Continuous <Continuous>  sodium bicarbonate  Infusion 0.114 mEq/kG/Hr IV Continuous <Continuous>                        10.7   23.3  )-----------( x        ( 2017 06:37 )             30.1     07-19    143  |  103  |  31<H>  ----------------------------<  139<H>  3.5   |  31  |  1.00    Ca    7.9<L>      2017 06:37  Phos  2.4       Mg     1.9         TPro  4.7<L>  /  Alb  2.4<L>  /  TBili  1.2  /  DBili  x   /  AST  99<H>  /  ALT  60  /  AlkPhos  66  07-19    Lactate 12.9           -18 @ 14:04    Lactate 13.7           07-18 @ 09:04      CARDIAC MARKERS ( 2017 22:29 )  2.460 ng/mL / x     / 1498 U/L / x     / 27.1 ng/mL  CARDIAC MARKERS ( 2017 14:04 )  3.520 ng/mL / x     / 1151 U/L / x     / 31.2 ng/mL  CARDIAC MARKERS ( 2017 06:35 )  3.560 ng/mL / x     / 509 U/L / x     / 17.8 ng/mL  CARDIAC MARKERS ( 2017 11:09 )  .053 ng/mL / x     / 133 U/L / x     / 1.8 ng/mL    PT/INR - ( 2017 10:27 )   PT: 20.2 sec;   INR: 1.83 ratio       PTT - ( 2017 10:27 )  PTT:34.8 sec  Urinalysis Basic - ( 2017 01:07 )    Color: Yellow / Appearance: x / S.020 / pH: x  Gluc: x / Ketone: Trace  / Bili: Negative / Urobili: Negative   Blood: x / Protein: 25 mg/dL / Nitrite: Negative   Leuk Esterase: Negative / RBC: 3-5 /HPF / WBC 3-5   Sq Epi: x / Non Sq Epi: Few / Bacteria: Few    ABG - ( 2017 21:20 )  pH: 7.35  /  pCO2: 19    /  pO2: 62    / HCO3: 14    / Base Excess: -14.6 /  SaO2: 89          Radiology:   < from: CT Abdomen and Pelvis w/ Oral Cont (17 @ 19:03) >    EXAM:  CT ABDOMEN AND PELVIS OC                            *** ADDENDUM 2017  ***    Please note, the body of the report contains a voice transcription error.   The corrected line should read as follows: Markedly distended gallbladder   with pericholecystic fluid      *** END OF ADDENDUM 2017  ***      PROCEDURE DATE:  2017          INTERPRETATION:  History: Septic shock.    CT abdomen and pelvis only oral contrast. Prior 2016.  Nasogastric tube in position. Small basilareffusions and dependent   atelectasis. Small pericardial effusion  Bladder markedly distended gallbladder without pericholecystic fluid.   Correlate with right upper quadrant ultrasound. No significant biliary   dilatation. Unenhanced liver spleen not remarkable. Atrophic pancreas.  No hydronephrosis or urolithiasis.  No suspicious adenopathy by CT size criteria.  Normal caliber abdominal aorta.  There is mild diffuse small bowel thickening consistent with nonspecific   enteritis. No bowel obstruction. Mild generalized ileus. No evidence of   appendicitis. No free air  There is a small amount of simple right upper quadrant ascites. Bladder   decompressed with Maddox.  Bilateral acute comminuted displaced acetabular fractures and left iliac   fracture. Bilateral associated obturator hematoma. There is moderate   hemorrhagic ascites/hematoma in the lower pelvis.  Mild anasarca.    Impression:    Markedly distended gallbladder with pericholecystic fluid. Correlate with   right upper quadrant ultrasound  Nonspecific diffuse enteritis.  Bilateral pelvic fractures as described with associated   hematoma/hemorrhagic ascites in the lower pelvis.    < end of copied text >        CENTRAL LINE: Y   REMOVE: N    MADDOX: Y        REMOVE: Y    A-LINE: Y/N     DATE INSERTED:              REMOVE: Y/N    GLOBAL ISSUE/BEST PRACTICE:  Analgesia: yes  Sedation: no  HOB elevation: yes  Stress ulcer prophylaxis: yes  VTE prophylaxis: yes  Glycemic control: yes  Nutrition: yes    CODE STATUS: DNR/DNI Interval events: Status unchanged overnight, found to have acalculous cholecystitis on abdominal CT with incidental finding of bilateral acetabular fractures and iliac fracture with hematoma.  received 2 PRBCs  Per  patient is less alert than at baseline.   DNR/DNI    Review of Systems:  Unable to assess due to patient's baseline status.     T(F): 97.7 (07-19-17 @ 04:01), Max: 98.6 (07-18-17 @ 12:01)  HR: 66 (07-19-17 @ 07:30) (65 - 108)  BP: 110/53 (07-19-17 @ 07:30) (79/51 - 161/63)  RR: 18 (07-19-17 @ 07:30) (16 - 30)  SpO2: 96% (07-19-17 @ 07:30) (88% - 100%)    CAPILLARY BLOOD GLUCOSE  128 (19 Jul 2017 06:00)        I&O's Summary    18 Jul 2017 07:01  -  19 Jul 2017 07:00  --------------------------------------------------------  IN: 4243.8 mL / OUT: 1090 mL / NET: 3153.8 mL        Physical Exam:     Gen: minimally responsive to pain, not responsive to verbal,   Neuro: eyes open spontaneously, nothing verbal  HEENT: PERRLA, mucous membranes moist  CV: Regular rate and rhythm, systolic ejection murmur,   Pulm: CTAB, no wheezes rhonchi or rales  GI: abdomen soft, non-distended  Ext: moving all extremities spontaneously, swelling decreased on LLE  Skin: extensive bruising noted to bilateral UE    Meds:    meropenem IVPB   IV Intermittent   meropenem IVPB 1000 milliGRAM(s) IV Intermittent every 12 hours  vancomycin  IVPB 1000 milliGRAM(s) IV Intermittent every 24 hours  vancomycin  IVPB   IV Intermittent   norepinephrine Infusion 0.1 MICROgram(s)/kG/Min IV Continuous <Continuous>  vasopressin Infusion 0.04 Unit(s)/Min IV Continuous <Continuous>  insulin lispro (HumaLOG) corrective regimen sliding scale   SubCutaneous every 6 hours  dextrose Gel 1 Dose(s) Oral once PRN  dextrose 50% Injectable 12.5 Gram(s) IV Push once  dextrose 50% Injectable 25 Gram(s) IV Push once  dextrose 50% Injectable 25 Gram(s) IV Push once  glucagon  Injectable 1 milliGRAM(s) IntraMuscular once PRN  acetaminophen  Suppository 650 milliGRAM(s) Rectal every 6 hours PRN  dextrose 5%. 1000 milliLiter(s) IV Continuous <Continuous>  sodium bicarbonate  Infusion 0.114 mEq/kG/Hr IV Continuous <Continuous>                        10.7   23.3  )-----------( x        ( 19 Jul 2017 06:37 )             30.1     07-19    143  |  103  |  31<H>  ----------------------------<  139<H>  3.5   |  31  |  1.00    Ca    7.9<L>      19 Jul 2017 06:37  Phos  2.4     07-19  Mg     1.9     07-19    TPro  4.7<L>  /  Alb  2.4<L>  /  TBili  1.2  /  DBili  x   /  AST  99<H>  /  ALT  60  /  AlkPhos  66  07-19    Lactate 12.9           07-18 @ 14:04  Lactate 13.7           07-18 @ 09:04      CARDIAC MARKERS ( 18 Jul 2017 22:29 )  2.460 ng/mL / x     / 1498 U/L / x     / 27.1 ng/mL  CARDIAC MARKERS ( 18 Jul 2017 14:04 )  3.520 ng/mL / x     / 1151 U/L / x     / 31.2 ng/mL  CARDIAC MARKERS ( 18 Jul 2017 06:35 )  3.560 ng/mL / x     / 509 U/L / x     / 17.8 ng/mL  CARDIAC MARKERS ( 17 Jul 2017 11:09 )  .053 ng/mL / x     / 133 U/L / x     / 1.8 ng/mL    Radiology:   < from: CT Abdomen and Pelvis w/ Oral Cont (07.18.17 @ 19:03) >    EXAM:  CT ABDOMEN AND PELVIS OC                            *** ADDENDUM 07/18/2017  ***    Please note, the body of the report contains a voice transcription error.   The corrected line should read as follows: Markedly distended gallbladder   with pericholecystic fluid      *** END OF ADDENDUM 07/18/2017  ***      PROCEDURE DATE:  07/18/2017          INTERPRETATION:  History: Septic shock.    CT abdomen and pelvis only oral contrast. Prior 8/20/2016.  Nasogastric tube in position. Small basilareffusions and dependent   atelectasis. Small pericardial effusion  Bladder markedly distended gallbladder without pericholecystic fluid.   Correlate with right upper quadrant ultrasound. No significant biliary   dilatation. Unenhanced liver spleen not remarkable. Atrophic pancreas.  No hydronephrosis or urolithiasis.  No suspicious adenopathy by CT size criteria.  Normal caliber abdominal aorta.  There is mild diffuse small bowel thickening consistent with nonspecific   enteritis. No bowel obstruction. Mild generalized ileus. No evidence of   appendicitis. No free air  There is a small amount of simple right upper quadrant ascites. Bladder   decompressed with Maddox.  Bilateral acute comminuted displaced acetabular fractures and left iliac   fracture. Bilateral associated obturator hematoma. There is moderate   hemorrhagic ascites/hematoma in the lower pelvis.  Mild anasarca.    Impression:    Markedly distended gallbladder with pericholecystic fluid. Correlate with   right upper quadrant ultrasound  Nonspecific diffuse enteritis.  Bilateral pelvic fractures as described with associated   hematoma/hemorrhagic ascites in the lower pelvis.    < end of copied text >    TTE Echo Doppler w/o Cont (07.18.17 @ 10:57) >  FINDINGS  Left Ventricle: Concentric left ventricular hypertrophy. Normal left   ventricular systolic function.  Aortic Valve: Heavily Calcified aortic valve with decreased opening. Mild   aortic insufficiency. Peak transaortic gradient equals 100 mmHg, and mean   transaortic gradient equals 67 mmHg. The aortic valve area by continuity   equation is 0.4 sq cm, consistent with critical aortic stenosis.  Mitral Valve: Mitral annular calcification. Otherwise normal mitral valve.  Tricuspid Valve: Tricuspid valve. Mild tricuspid insufficiency.  Pulmonic Valve: Normal pulmonic valve. Minimal pulmonic insufficiency.  Left Atrium: Enlarged  Right Ventricle: Right ventricular enlargement with borderline right   ventricular systolic function.  Right Atrium: Mildly enlarged  Diastolic Function: Grade 1 diastolic dysfunction.  Pericardium/Pleura: Normal pericardium with no pericardial effusion.   Bilateral pleural effusions.    < end of copied text >      CENTRAL LINE: Y   REMOVE: N    MADDOX: Y        REMOVE: Y    A-LINE: Y/N     DATE INSERTED:              REMOVE: Y/N    GLOBAL ISSUE/BEST PRACTICE:  Analgesia: yes  Sedation: no  HOB elevation: yes  Stress ulcer prophylaxis: yes  VTE prophylaxis: yes  Glycemic control: yes  Nutrition: yes    CODE STATUS: DNR/DNI

## 2017-07-19 NOTE — PROGRESS NOTE ADULT - PROBLEM SELECTOR PLAN 1
goals of care to drive management.  Can stop Vanco and then decisions about drainage and further antimicrobials based on understanding patient's prestated desires. My impression speaking with  is that comfort care and perhaps no further abx would be consistent with these goals.

## 2017-07-19 NOTE — PROGRESS NOTE ADULT - SUBJECTIVE AND OBJECTIVE BOX
Patient status unchanged overnight. No acute events.  CT Abd/Pel performed found incidental finding of B/L acetabular fractures with L iliac fx and hematoma. Per family denies any known trauma or falls.       < from: CT Abdomen and Pelvis w/ Oral Cont (07.18.17 @ 19:03) >  Impression:    Markedly distended gallbladder with pericholecystic fluid. Correlate with   right upper quadrant ultrasound  Nonspecific diffuse enteritis.  Bilateral pelvic fractures as described with associated   hematoma/hemorrhagic ascites in the lower pelvis.    < end of copied text >    Physical Exam    Left Lower Extremity:  Diffuse swelling reduced from yesterday, heel pressure ulcer protection in place  Small poke hole noted to anterior left shin covered with gauze, no drainage, 1cm ulcer noted to L heel clean, dry without surrounding erythema  +EHL/FHL/TA/GS  Pulses palpable foot warm  Compartments soft  Unable to assess pain in limb with log roll due to baseline mentation   Motor/sensation grossly intact unable to fully assess but withdrawing from pain    Right Lower Extremity:  +EHL/FHL/TA/GS  Motor/sensation grossly intact unable to fully assess but withdrawing from pain  +DP/PT Pulses  Compartments soft  Unable to assess pain with log roll due to baseline mentation

## 2017-07-19 NOTE — CHART NOTE - NSCHARTNOTEFT_GEN_A_CORE
A: 82 year old female with bilateral acetabular fractures and fluid extravasation from infiltrated IO.     P: No plan for surgery at this time, will continue with all comfort measures for pain. Non-weight bearing for 3 months. Fractures may be monitored with serial x-rays on an in/outpatient basis. No further imaging indicated for further management of this fracture at this time. Gently care, Latisha lift transfers. Soft tissue swelling reduced in LLE no indication for any orthopedic intervention at this time. Continued medical management per ICU team, no further orthopedic intervention planned at this time. Patient orthopedically stable. Discussed with attending in Ortho service.

## 2017-07-19 NOTE — PROGRESS NOTE ADULT - ASSESSMENT
Less responsive today.  Vitals remain stable with pharmaceutical support, remains on pressors.  WBC down slightly.  Cr improving.  H/H improved s/p Transfusion.  Prognosis remains guarded.  Poor long term outlook.      Suspicion of acalculous cholecystitis given GB thickening and pericholecystic fluid.  May consider Percutaneous cholecystostomy.  Discussed with family who remains uncertain if they want any such intervention as it will not improve the long term outcome or quality of life.  She remains DNR.    Following conversation with the , although non-committal at this time, it appears he is leaning toward palliative care and no further aggressive intervention.    Continue supportive ICU care and antibiotics.      This patient is not presently a surgical candidate.  Please reconsult as needed.

## 2017-07-19 NOTE — PROGRESS NOTE ADULT - ASSESSMENT
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    1. Neuro: mental status decreased, hold off on antiepileptics at this point. Possible seizure.  2. CV: likely distributive shock, c/w norepinephrine add vasopressin. Awaiting echo results from cardiologist (Dr. Durand). c/w asa 81 qd.  3. Pulm: no active issues, c/w NC O2  4. GI: NPO for now  5. Renal/Metabolic:  Chandler now placed, strict I/Os, monitor BUN/Cr/K/HCO3  6. ID: Per ID started meropenem f/up cultures, c/w Zosyn, hold Vancomycin given not recently hospitalized  7. Heme: DVT ppx, trend leukocytosis (likely due to sepsis vs. seizure)  8. Endo: no active issues  9. Skin: L heel ulcer, does not appear infected, wound care eval; resolved shock, d/c IO in AM if remains stable, PIV in R arm placed  10. 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    1. Neuro: mental status decreased, hold off on antiepileptics at this point. Possible seizure. morphine PRN  2. CV: likely distributive shock, c/w norepinephrine add vasopressin. Awaiting echo results from cardiologist (Dr. Durand). c/w asa 81 qd.   3. Pulm: no active issues, c/w NC O2  4. GI: NPO for now  5. Renal/Metabolic: D/C Bicarb drip,  Chandler now placed, strict I/Os, monitor BUN/Cr/K/HCO3  6. ID: Cholecystitis D/C Vanco c/w meropenem,  7. Heme: DVT ppx, trend leukocytosis (likely due to sepsis vs. seizure)  8. Endo: no active issues  9. Skin: L heel ulcer, does not appear infected, wound care eval; resolved shock, d/c IO in AM if remains stable, PIV in R arm placed  10. Dispo: DNR/DNI

## 2017-07-19 NOTE — PROGRESS NOTE ADULT - SUBJECTIVE AND OBJECTIVE BOX
Vital Signs Last 24 Hrs  T(C): 36.7 (19 Jul 2017 16:00), Max: 37.1 (19 Jul 2017 12:01)  T(F): 98.1 (19 Jul 2017 16:00), Max: 98.8 (19 Jul 2017 12:01)  HR: 67 (19 Jul 2017 19:30) (60 - 101)  BP: 108/54 (19 Jul 2017 19:30) (94/46 - 142/58)  BP(mean): 78 (19 Jul 2017 19:30) (66 - 84)  RR: 16 (19 Jul 2017 19:30) (15 - 26)  SpO2: 99% (19 Jul 2017 19:30) (93% - 100%)    07-18 @ 07:01  -  07-19 @ 07:00  --------------------------------------------------------  IN: 4243.8 mL / OUT: 1090 mL / NET: 3153.8 mL    07-19 @ 07:01  -  07-19 @ 19:51  --------------------------------------------------------  IN: 343.7 mL / OUT: 560 mL / NET: -216.3 mL                              10.7   23.3  )-----------( 50       ( 19 Jul 2017 06:37 )             30.1     07-19    143  |  103  |  31<H>  ----------------------------<  139<H>  3.5   |  31  |  1.00    Ca    7.9<L>      19 Jul 2017 06:37  Phos  2.4     07-19  Mg     1.9     07-19    TPro  4.7<L>  /  Alb  2.4<L>  /  TBili  1.2  /  DBili  x   /  AST  99<H>  /  ALT  60  /  AlkPhos  66  07-19      Physical Exam:  Dementia.  Less responsive today.  Lungs clear bilaterally without wheezes rhonchi or rales.  Regular rate and rhythm  Abdomen soft, nontender, nondistended, positive bowel sounds in all 4 quadrants. No evidence of hernia or masses. No rebound or guarding.  Negative Lawrence's sign.  Less resonsive today to lower abdominal palpation.  Extremities with edema L>R.  Small left heel ulcer unchanged.

## 2017-07-19 NOTE — PROGRESS NOTE ADULT - SUBJECTIVE AND OBJECTIVE BOX
infectious diseases progress note:    SUNIL BASILIO is a 82y y. o. Female patient    Patient nonverbal    Allergies    No Known Allergies    Intolerances        ANTIBIOTICS/RELEVANT:  antimicrobials  meropenem IVPB   IV Intermittent   meropenem IVPB 1000 milliGRAM(s) IV Intermittent every 12 hours  vancomycin  IVPB 1000 milliGRAM(s) IV Intermittent every 24 hours  vancomycin  IVPB   IV Intermittent     immunologic:    OTHER:  acetaminophen  Suppository 650 milliGRAM(s) Rectal every 6 hours PRN  norepinephrine Infusion 0.1 MICROgram(s)/kG/Min IV Continuous <Continuous>  vasopressin Infusion 0.04 Unit(s)/Min IV Continuous <Continuous>  insulin lispro (HumaLOG) corrective regimen sliding scale   SubCutaneous every 6 hours  dextrose 5%. 1000 milliLiter(s) IV Continuous <Continuous>  dextrose Gel 1 Dose(s) Oral once PRN  sodium bicarbonate  Infusion 0.114 mEq/kG/Hr IV Continuous <Continuous>  dextrose 50% Injectable 12.5 Gram(s) IV Push once  dextrose 50% Injectable 25 Gram(s) IV Push once  dextrose 50% Injectable 25 Gram(s) IV Push once  glucagon  Injectable 1 milliGRAM(s) IntraMuscular once PRN  morphine  - Injectable 2 milliGRAM(s) IV Push every 4 hours PRN      Objective:  Last 24-Vital Signs Last 24 Hrs  T(C): 36.7 (2017 08:01), Max: 37 (2017 12:01)  T(F): 98.1 (2017 08:01), Max: 98.6 (2017 12:01)  HR: 67 (2017 08:00) (65 - 108)  BP: 113/53 (2017 08:00) (79/51 - 161/63)  BP(mean): 77 (2017 08:00) (60 - 91)  RR: 19 (2017 08:00) (16 - 30)  SpO2: 99% (2017 08:00) (88% - 100%)    T(C): 36.7 (2017 08:01), Max: 37.1 (2017 09:50)  T(F): 98.1 (2017 08:01), Max: 98.8 (2017 09:50)  T(C): 36.7 (2017 08:01), Max: 37.1 (2017 09:50)  T(F): 98.1 (2017 08:01), Max: 98.8 (2017 09:50)  T(C): 36.7 (2017 08:01), Max: 37.1 (2017 09:50)  T(F): 98.1 (2017 08:01), Max: 98.8 (2017 09:50)    PHYSICAL EXAM:  Constitutional:Well-developed, well nourished  Eyes:PERRLA, EOMI  Ear/Nose/Throat: oropharynx normal	  Neck:no JVD, no lymphadenopathy, supple  Respiratory: no accessory muscle use, lung fields bilaterally clear  Cardiovascular:RRR, normal S1, S2 no m/r/g  Gastrointestinal:soft, RUQ tender, no HSM, BS-normal  Extremities:no clubbing, no cyanosis, edema noted  Neuro-patient not alert  Skin-no sig lesions      LABS:                        10.7   23.3  )-----------( x        ( 2017 06:37 )             30.1       WBC 23.3  07-19 @ 06:37  WBC 30.2  07-18 @ 09:04  WBC 21.5  0718 @ 06:35  WBC 24.6  -17 @ 09:59      07-19    143  |  103  |  31<H>  ----------------------------<  139<H>  3.5   |  31  |  1.00    Ca    7.9<L>      2017 06:37  Phos  2.4       Mg     1.9     19    TPro  4.7<L>  /  Alb  2.4<L>  /  TBili  1.2  /  DBili  x   /  AST  99<H>  /  ALT  60  /  AlkPhos  66  -19    PT/INR - ( 2017 10:27 )   PT: 20.2 sec;   INR: 1.83 ratio         PTT - ( 2017 10:27 )  PTT:34.8 sec  Urinalysis Basic - ( 2017 01:07 )    Color: Yellow / Appearance: x / S.020 / pH: x  Gluc: x / Ketone: Trace  / Bili: Negative / Urobili: Negative   Blood: x / Protein: 25 mg/dL / Nitrite: Negative   Leuk Esterase: Negative / RBC: 3-5 /HPF / WBC 3-5   Sq Epi: x / Non Sq Epi: Few / Bacteria: Few          MICROBIOLOGY:        RADIOLOGY & ADDITIONAL STUDIES:    < from: CT Abdomen and Pelvis w/ Oral Cont (17 @ 19:03) >    EXAM:  CT ABDOMEN AND PELVIS OC                            *** ADDENDUM 2017  ***    Please note, the body of the report contains a voice transcription error.   The corrected line should read as follows: Markedly distended gallbladder   with pericholecystic fluid      *** END OF ADDENDUM 2017  ***      PROCEDURE DATE:  2017          INTERPRETATION:  History: Septic shock.    CT abdomen and pelvis only oral contrast. Prior 2016.  Nasogastric tube in position. Small basilareffusions and dependent   atelectasis. Small pericardial effusion  Bladder markedly distended gallbladder without pericholecystic fluid.   Correlate with right upper quadrant ultrasound. No significant biliary   dilatation. Unenhanced liver spleen not remarkable. Atrophic pancreas.  No hydronephrosis or urolithiasis.  No suspicious adenopathy by CT size criteria.  Normal caliber abdominal aorta.  There is mild diffuse small bowel thickening consistent with nonspecific   enteritis. No bowel obstruction. Mild generalized ileus. No evidence of   appendicitis. No free air  There is a small amount of simple right upper quadrant ascites. Bladder   decompressed with Chandler.  Bilateral acute comminuted displaced acetabular fractures and left iliac   fracture. Bilateral associated obturator hematoma. There is moderate   hemorrhagic ascites/hematoma in the lower pelvis.  Mild anasarca.    Impression:    Markedly distended gallbladder with pericholecystic fluid. Correlate with   right upper quadrant ultrasound  Nonspecific diffuse enteritis.  Bilateral pelvic fractures as described with associated   hematoma/hemorrhagic ascites in the lower pelvis.      Discussed this case with Dr. Whalen prior to this dictation      ***Please see the addendum at the top of this report. It may contain   additional important information or changes.****          CHANCE MORGAN M.D., ATTENDING RADIOLOGIST  This document has been electronically signed. 2017  7:14PM  Addend:  CHANCE MORGAN M.D., ATTENDINGRADIOLOGIST  This addendum was electronically signed on: 2017  8:00PM.          < end of copied text >

## 2017-07-20 LAB
ALBUMIN SERPL ELPH-MCNC: 2.4 G/DL — LOW (ref 3.3–5)
ALP SERPL-CCNC: 71 U/L — SIGNIFICANT CHANGE UP (ref 40–120)
ALT FLD-CCNC: 56 U/L — SIGNIFICANT CHANGE UP (ref 12–78)
ANION GAP SERPL CALC-SCNC: 9 MMOL/L — SIGNIFICANT CHANGE UP (ref 5–17)
AST SERPL-CCNC: 78 U/L — HIGH (ref 15–37)
BASOPHILS # BLD AUTO: 0.1 K/UL — SIGNIFICANT CHANGE UP (ref 0–0.2)
BASOPHILS NFR BLD AUTO: 0.3 % — SIGNIFICANT CHANGE UP (ref 0–2)
BILIRUB SERPL-MCNC: 0.7 MG/DL — SIGNIFICANT CHANGE UP (ref 0.2–1.2)
BUN SERPL-MCNC: 29 MG/DL — HIGH (ref 7–23)
CALCIUM SERPL-MCNC: 7.7 MG/DL — LOW (ref 8.5–10.1)
CHLORIDE SERPL-SCNC: 104 MMOL/L — SIGNIFICANT CHANGE UP (ref 96–108)
CO2 SERPL-SCNC: 31 MMOL/L — SIGNIFICANT CHANGE UP (ref 22–31)
CREAT SERPL-MCNC: 0.83 MG/DL — SIGNIFICANT CHANGE UP (ref 0.5–1.3)
EOSINOPHIL # BLD AUTO: 0 K/UL — SIGNIFICANT CHANGE UP (ref 0–0.5)
EOSINOPHIL NFR BLD AUTO: 0 % — SIGNIFICANT CHANGE UP (ref 0–6)
GLUCOSE SERPL-MCNC: 127 MG/DL — HIGH (ref 70–99)
HCT VFR BLD CALC: 26.6 % — LOW (ref 34.5–45)
HGB BLD-MCNC: 9.3 G/DL — LOW (ref 11.5–15.5)
LYMPHOCYTES # BLD AUTO: 1.3 K/UL — SIGNIFICANT CHANGE UP (ref 1–3.3)
LYMPHOCYTES # BLD AUTO: 7.5 % — LOW (ref 13–44)
MAGNESIUM SERPL-MCNC: 2.1 MG/DL — SIGNIFICANT CHANGE UP (ref 1.6–2.6)
MCHC RBC-ENTMCNC: 32.3 PG — SIGNIFICANT CHANGE UP (ref 27–34)
MCHC RBC-ENTMCNC: 35 GM/DL — SIGNIFICANT CHANGE UP (ref 32–36)
MCV RBC AUTO: 92.2 FL — SIGNIFICANT CHANGE UP (ref 80–100)
MONOCYTES # BLD AUTO: 0.8 K/UL — SIGNIFICANT CHANGE UP (ref 0–0.9)
MONOCYTES NFR BLD AUTO: 4.8 % — SIGNIFICANT CHANGE UP (ref 1–9)
NEUTROPHILS # BLD AUTO: 14.8 K/UL — HIGH (ref 1.8–7.4)
NEUTROPHILS NFR BLD AUTO: 87.4 % — HIGH (ref 43–77)
PHOSPHATE SERPL-MCNC: 1.9 MG/DL — LOW (ref 2.5–4.5)
PLATELET # BLD AUTO: 43 K/UL — LOW (ref 150–400)
POTASSIUM SERPL-MCNC: 3.3 MMOL/L — LOW (ref 3.5–5.3)
POTASSIUM SERPL-SCNC: 3.3 MMOL/L — LOW (ref 3.5–5.3)
PROT SERPL-MCNC: 4.8 G/DL — LOW (ref 6–8.3)
RBC # BLD: 2.88 M/UL — LOW (ref 3.8–5.2)
RBC # FLD: 14 % — SIGNIFICANT CHANGE UP (ref 10.3–14.5)
SODIUM SERPL-SCNC: 144 MMOL/L — SIGNIFICANT CHANGE UP (ref 135–145)
WBC # BLD: 16.9 K/UL — HIGH (ref 3.8–10.5)
WBC # FLD AUTO: 16.9 K/UL — HIGH (ref 3.8–10.5)

## 2017-07-20 PROCEDURE — 99291 CRITICAL CARE FIRST HOUR: CPT

## 2017-07-20 PROCEDURE — 72190 X-RAY EXAM OF PELVIS: CPT | Mod: 26

## 2017-07-20 RX ORDER — POTASSIUM CHLORIDE 20 MEQ
40 PACKET (EA) ORAL EVERY 4 HOURS
Qty: 0 | Refills: 0 | Status: COMPLETED | OUTPATIENT
Start: 2017-07-20 | End: 2017-07-20

## 2017-07-20 RX ORDER — SODIUM,POTASSIUM PHOSPHATES 278-250MG
1 POWDER IN PACKET (EA) ORAL
Qty: 0 | Refills: 0 | Status: COMPLETED | OUTPATIENT
Start: 2017-07-20 | End: 2017-07-21

## 2017-07-20 RX ADMIN — Medication 1 TABLET(S): at 21:04

## 2017-07-20 RX ADMIN — MORPHINE SULFATE 2 MILLIGRAM(S): 50 CAPSULE, EXTENDED RELEASE ORAL at 18:49

## 2017-07-20 RX ADMIN — Medication 40 MILLIEQUIVALENT(S): at 17:15

## 2017-07-20 RX ADMIN — MORPHINE SULFATE 2 MILLIGRAM(S): 50 CAPSULE, EXTENDED RELEASE ORAL at 19:05

## 2017-07-20 RX ADMIN — MEROPENEM 200 MILLIGRAM(S): 1 INJECTION INTRAVENOUS at 17:16

## 2017-07-20 RX ADMIN — Medication 1 TABLET(S): at 17:15

## 2017-07-20 RX ADMIN — MEROPENEM 200 MILLIGRAM(S): 1 INJECTION INTRAVENOUS at 05:14

## 2017-07-20 RX ADMIN — Medication 40 MILLIEQUIVALENT(S): at 13:17

## 2017-07-20 NOTE — PROGRESS NOTE ADULT - SUBJECTIVE AND OBJECTIVE BOX
Neurology follow up note    SUNIL [id number]      Interval History:    Patient seen with family off pressors     MEDICATIONS    acetaminophen  Suppository 650 milliGRAM(s) Rectal every 6 hours PRN  meropenem IVPB   IV Intermittent   meropenem IVPB 1000 milliGRAM(s) IV Intermittent every 12 hours  insulin lispro (HumaLOG) corrective regimen sliding scale   SubCutaneous every 6 hours  dextrose 5%. 1000 milliLiter(s) IV Continuous <Continuous>  dextrose Gel 1 Dose(s) Oral once PRN  dextrose 50% Injectable 12.5 Gram(s) IV Push once  dextrose 50% Injectable 25 Gram(s) IV Push once  dextrose 50% Injectable 25 Gram(s) IV Push once  glucagon  Injectable 1 milliGRAM(s) IntraMuscular once PRN  morphine  - Injectable 2 milliGRAM(s) IV Push every 4 hours PRN  potassium acid phosphate/sodium acid phosphate tablet (K-PHOS No. 2) 1 Tablet(s) Oral four times a day with meals  potassium chloride    Tablet ER 40 milliEquivalent(s) Oral every 4 hours      Allergies    No Known Allergies    Intolerances            Vital Signs Last 24 Hrs  T(C): 36.4 (20 Jul 2017 11:57), Max: 37.1 (20 Jul 2017 04:01)  T(F): 97.6 (20 Jul 2017 11:57), Max: 98.7 (20 Jul 2017 04:01)  HR: 76 (20 Jul 2017 15:00) (62 - 82)  BP: 121/59 (20 Jul 2017 15:00) (90/51 - 144/67)  BP(mean): 84 (20 Jul 2017 15:00) (68 - 102)  RR: 13 (20 Jul 2017 15:00) (13 - 22)  SpO2: 100% (20 Jul 2017 15:00) (93% - 100%)      REVIEW OF SYSTEMS: Non Verbal       On Neurological Examination:    pupils bilateral 3mm reactive  2mm  no bilateral blink to visual threat     Mental Status - Patient is  Lethargic Unresponsive  .     Does not follow commands-- baseline wouldn't as per family     Speech -    Aphasia   Non Verbal                         Cranial Nerves - eye no gaze preference   smile symmetric  intact bilateral NLF    Motor Exam -   With stimuli positive movement  bilateral lower extremities  feet,  no  hand grasp     Muscle tone - increased bilateral lower       GENERAL Exam: Nontoxic , No Acute Distress   	  HEENT:  normocephalic, atraumatic  		  LUNGS:  Decreased bilaterally  	  HEART: Normal S1S2   No murmur RRR        	  GI/ ABDOMEN:  Soft  Non tender    EXTREMITIES:   No Edema  No Clubbing  No Cyanosis No Edema         LABS:         LABS:  CBC Full  -  ( 20 Jul 2017 06:11 )  WBC Count : 16.9 K/uL  Hemoglobin : 9.3 g/dL  Hematocrit : 26.6 %  Platelet Count - Automated : 43 K/uL  Mean Cell Volume : 92.2 fl  Mean Cell Hemoglobin : 32.3 pg  Mean Cell Hemoglobin Concentration : 35.0 gm/dL  Auto Neutrophil # : 14.8 K/uL  Auto Lymphocyte # : 1.3 K/uL  Auto Monocyte # : 0.8 K/uL  Auto Eosinophil # : 0.0 K/uL  Auto Basophil # : 0.1 K/uL  Auto Neutrophil % : 87.4 %  Auto Lymphocyte % : 7.5 %  Auto Monocyte % : 4.8 %  Auto Eosinophil % : 0.0 %  Auto Basophil % : 0.3 %      07-20    144  |  104  |  29<H>  ----------------------------<  127<H>  3.3<L>   |  31  |  0.83    Ca    7.7<L>      20 Jul 2017 06:11  Phos  1.9     07-20  Mg     2.1     07-20    TPro  4.8<L>  /  Alb  2.4<L>  /  TBili  0.7  /  DBili  x   /  AST  78<H>  /  ALT  56  /  AlkPhos  71  07-20    Hemoglobin A1C:     LIVER FUNCTIONS - ( 20 Jul 2017 06:11 )  Alb: 2.4 g/dL / Pro: 4.8 g/dL / ALK PHOS: 71 U/L / ALT: 56 U/L / AST: 78 U/L / GGT: x           Vitamin B12         RADIOLOGY    ANALYSIS AND PLAN:  An 82-year-old with episode of unresponsiveness and history of dementia.    1.	For episode of unresponsiveness, Suspect the patient has a septic shock type of component.  Also the patient's stiffening, questionable if this was a seizure event versus possibly secondary to cerebral hypoperfusion, leading to episode of stiffening no AED at present. No EEG technician  but patient does not appear to be in status.   No new events reports .Questionable cardiac events   2.	 antibiotics as needed.  3.	For history of dementia, it appears to be advanced.  I will recommend supportive therapy.  4.	I will recommend if possible please maintain her systolic blood pressure above 100 to help maintain cerebral perfusion off pressors  5.	for NGT feedings   6.	At present  overall prognosis appears to be guareded  7.	monitor electrolytes   8.	also with bilateral fractures patient overall long term prognosis  may be poor   9.	Spoke with  and daughter at bedside----- 7/20/17 Timothy, at [phone].      Thank you for the courtesy of consultation.    Plan of care was discussed with family. Questions answered.  Would continue to follow.  20 minutes

## 2017-07-20 NOTE — CONSULT NOTE ADULT - CONSULT REASON
.
R/O compartment syndrome LLE
Sepsis
Sepsis, Distended, thickened GB
rule out dental infection for sepsis
septic shock
hypotension

## 2017-07-20 NOTE — PROGRESS NOTE ADULT - ASSESSMENT
81 y/o F with PMHx of Alzheimer Dementia, severe aortic stenosis, presented to the ED with syncope and hypotension, now with sepsis with multiorgan involvement and hypoperfusion, GB thought to be the source, for potential percutaneous cholecystostomy tube, but now off pressors and improving.    -TTE with evidence of critical AS  - off pressors at current time, without perc bart   - CE trend could possibly be related to ischemia.  CK and MB out of proportion for an isolated coronary etiology, but troponin trend could be consistent with a small NSTEMI, in the setting of her primary issue  - Aspirin stopped for hemorrhagic ascites seen on abdominal/pelvic CT  - Abx per primary for septic shock  - Follow H/H, transfuse as necessary.  - No evidence of significant volume overload or uncontrolled arrhythmia.  Monitor closely for ADHF given severe AS  - Monitor and replete electrolytes. Keep K>4.0 and Mg>2.0.   - Further cardiac workup will depend on clinical course.   - All other workup per primary team. Will followup.   - Patient at high risk for periprocedural cardiac complications given clinical condition and comorbidities  - Pt with poor overall long term prognosis  - Patient at high risk for decompensation. Critically ill. >35 minutes of critical care time was spent with this patient.

## 2017-07-20 NOTE — PROGRESS NOTE ADULT - PROBLEM SELECTOR PLAN 1
as long as patient is not made comfort care will recommend continuing through course of antibiotics. Without drainage the actual course duration is not well defined so would be based on clinical response.  At this point recommend continuing current abx pending decisions regarding goals of care.

## 2017-07-20 NOTE — PROGRESS NOTE ADULT - SUBJECTIVE AND OBJECTIVE BOX
infectious diseases progress note:    SUNIL BASILIO is a 82y y. o. Female patient    Patient still nonverbal    Allergies    No Known Allergies    Intolerances        ANTIBIOTICS/RELEVANT:  antimicrobials  meropenem IVPB   IV Intermittent   meropenem IVPB 1000 milliGRAM(s) IV Intermittent every 12 hours    immunologic:    OTHER:  acetaminophen  Suppository 650 milliGRAM(s) Rectal every 6 hours PRN  insulin lispro (HumaLOG) corrective regimen sliding scale   SubCutaneous every 6 hours  dextrose 5%. 1000 milliLiter(s) IV Continuous <Continuous>  dextrose Gel 1 Dose(s) Oral once PRN  dextrose 50% Injectable 12.5 Gram(s) IV Push once  dextrose 50% Injectable 25 Gram(s) IV Push once  dextrose 50% Injectable 25 Gram(s) IV Push once  glucagon  Injectable 1 milliGRAM(s) IntraMuscular once PRN  morphine  - Injectable 2 milliGRAM(s) IV Push every 4 hours PRN      Objective:  Last 24-Vital Signs Last 24 Hrs  T(C): 36.5 (20 Jul 2017 07:30), Max: 37.1 (19 Jul 2017 12:01)  T(F): 97.7 (20 Jul 2017 07:30), Max: 98.8 (19 Jul 2017 12:01)  HR: 82 (20 Jul 2017 07:30) (60 - 83)  BP: 136/61 (20 Jul 2017 07:30) (94/46 - 142/72)  BP(mean): 88 (20 Jul 2017 07:30) (66 - 102)  RR: 18 (20 Jul 2017 07:30) (14 - 22)  SpO2: 96% (20 Jul 2017 07:30) (93% - 100%)    T(C): 36.5 (20 Jul 2017 07:30), Max: 37.1 (19 Jul 2017 12:01)  T(F): 97.7 (20 Jul 2017 07:30), Max: 98.8 (19 Jul 2017 12:01)  T(C): 36.5 (20 Jul 2017 07:30), Max: 37.1 (17 Jul 2017 09:50)  T(F): 97.7 (20 Jul 2017 07:30), Max: 98.8 (17 Jul 2017 09:50)  T(C): 36.5 (20 Jul 2017 07:30), Max: 37.1 (17 Jul 2017 09:50)  T(F): 97.7 (20 Jul 2017 07:30), Max: 98.8 (17 Jul 2017 09:50)    PHYSICAL EXAM:  Constitutional:Well-developed, well nourished  Eyes:PERRLA, EOMI  Ear/Nose/Throat: oropharynx normal	  Neck:no JVD, no lymphadenopathy, supple  Respiratory: no accessory muscle use, lung fields bilaterally clear  Cardiovascular:RRR, normal S1, S2 noted murmur  Gastrointestinal:soft, NT, no HSM, BS-normal  Extremities:no clubbing, no cyanosis, edema mild  Neuro-patient only responsive to deep pain  Skin-no sig lesions      LABS:                        9.3    16.9  )-----------( x        ( 20 Jul 2017 06:11 )             26.6       WBC 16.9  07-20 @ 06:11  WBC 23.3  07-19 @ 06:37  WBC 30.2  07-18 @ 09:04  WBC 21.5  07-18 @ 06:35  WBC 24.6  07-17 @ 09:59      07-20    144  |  104  |  29<H>  ----------------------------<  127<H>  3.3<L>   |  31  |  0.83    Ca    7.7<L>      20 Jul 2017 06:11  Phos  1.9     07-20  Mg     2.1     07-20    TPro  4.8<L>  /  Alb  2.4<L>  /  TBili  0.7  /  DBili  x   /  AST  78<H>  /  ALT  56  /  AlkPhos  71  07-20    PT/INR - ( 18 Jul 2017 10:27 )   PT: 20.2 sec;   INR: 1.83 ratio         PTT - ( 18 Jul 2017 10:27 )  PTT:34.8 sec        MICROBIOLOGY:        RADIOLOGY & ADDITIONAL STUDIES:

## 2017-07-20 NOTE — PROGRESS NOTE ADULT - SUBJECTIVE AND OBJECTIVE BOX
Interval events: no acute events overnight, patient was weaned off of pressors last night with stable vital signs.     Review of Systems:  unable to assess due to patient baseline status    T(F): 97.7 (07-20-17 @ 07:30), Max: 98.8 (07-19-17 @ 12:01)  HR: 82 (07-20-17 @ 07:30) (60 - 83)  BP: 136/61 (07-20-17 @ 07:30) (94/46 - 142/72)  RR: 18 (07-20-17 @ 07:30) (14 - 22)  SpO2: 96% (07-20-17 @ 07:30) (93% - 100%)  Wt(kg): --      CAPILLARY BLOOD GLUCOSE  141 (20 Jul 2017 05:30)        I&O's Summary    19 Jul 2017 07:01  -  20 Jul 2017 07:00  --------------------------------------------------------  IN: 489.3 mL / OUT: 920 mL / NET: -430.7 mL        Physical Exam:     Gen: minimally responsive to pain, not responsive to verbal,   Neuro: eyes open spontaneously, nothing verbal  HEENT: PERRLA, mucous membranes moist  CV: Regular rate and rhythm, systolic ejection murmur,   Pulm: CTAB, no wheezes rhonchi or rales  GI: abdomen soft, non-distended  Ext: moving all extremities spontaneously, distal pulses intact  Skin: extensive bruising noted to bilateral UE    Meds:  meropenem IVPB   IV Intermittent   meropenem IVPB 1000 milliGRAM(s) IV Intermittent every 12 hours  insulin lispro (HumaLOG) corrective regimen sliding scale   SubCutaneous every 6 hours  dextrose Gel 1 Dose(s) Oral once PRN  dextrose 50% Injectable 12.5 Gram(s) IV Push once  dextrose 50% Injectable 25 Gram(s) IV Push once  dextrose 50% Injectable 25 Gram(s) IV Push once  glucagon  Injectable 1 milliGRAM(s) IntraMuscular once PRN  acetaminophen  Suppository 650 milliGRAM(s) Rectal every 6 hours PRN  morphine  - Injectable 2 milliGRAM(s) IV Push every 4 hours PRN  dextrose 5%. 1000 milliLiter(s) IV Continuous <Continuous>                       9.3    16.9  )-----------( x        ( 20 Jul 2017 06:11 )             26.6     07-20    144  |  104  |  29<H>  ----------------------------<  127<H>  3.3<L>   |  31  |  0.83    Ca    7.7<L>      20 Jul 2017 06:11  Phos  1.9     07-20  Mg     2.1     07-20  TPro  4.8<L>  /  Alb  2.4<L>  /  TBili  0.7  /  DBili  x   /  AST  78<H>  /  ALT  56  /  AlkPhos  71  07-20    CARDIAC MARKERS ( 18 Jul 2017 22:29 )  2.460 ng/mL / x     / 1498 U/L / x     / 27.1 ng/mL  CARDIAC MARKERS ( 18 Jul 2017 14:04 )  3.520 ng/mL / x     / 1151 U/L / x     / 31.2 ng/mL    PT/INR - ( 18 Jul 2017 10:27 )   PT: 20.2 sec;   INR: 1.83 ratio      PTT - ( 18 Jul 2017 10:27 )  PTT:34.8 sec        CENTRAL LINE: Y   REMOVE: N    TAN: AB     REMOVE: N    A-LINE: N    GLOBAL ISSUE/BEST PRACTICE:  Analgesia: yes  Sedation: no  HOB elevation: yes  Stress ulcer prophylaxis: yes  VTE prophylaxis: yes  Glycemic control: yes  Nutrition: yes    CODE STATUS: DNR/DNI Interval events: no acute events overnight, patient was weaned off of pressors last night with stable vital signs.     Review of Systems:  unable to assess due to patient baseline status    T(F): 97.7 (07-20-17 @ 07:30), Max: 98.8 (07-19-17 @ 12:01)  HR: 82 (07-20-17 @ 07:30) (60 - 83)  BP: 136/61 (07-20-17 @ 07:30) (94/46 - 142/72)  RR: 18 (07-20-17 @ 07:30) (14 - 22)  SpO2: 96% (07-20-17 @ 07:30) (93% - 100%)    CAPILLARY BLOOD GLUCOSE  141 (20 Jul 2017 05:30)      I&O's Summary    19 Jul 2017 07:01  -  20 Jul 2017 07:00  --------------------------------------------------------  IN: 489.3 mL / OUT: 920 mL / NET: -430.7 mL        Physical Exam:     Gen: minimally responsive to pain, not responsive to verbal,   Neuro: eyes open spontaneously, nothing verbal  HEENT: PERRLA, mucous membranes moist  CV: Regular rate and rhythm, systolic ejection murmur,   Pulm: CTAB, no wheezes rhonchi or rales  GI: abdomen soft, non-distended  Ext: moving all extremities spontaneously, distal pulses intact  Skin: extensive bruising noted to bilateral UE    Meds:  meropenem IVPB   IV Intermittent   meropenem IVPB 1000 milliGRAM(s) IV Intermittent every 12 hours  insulin lispro (HumaLOG) corrective regimen sliding scale   SubCutaneous every 6 hours  dextrose Gel 1 Dose(s) Oral once PRN  dextrose 50% Injectable 12.5 Gram(s) IV Push once  dextrose 50% Injectable 25 Gram(s) IV Push once  dextrose 50% Injectable 25 Gram(s) IV Push once  glucagon  Injectable 1 milliGRAM(s) IntraMuscular once PRN  acetaminophen  Suppository 650 milliGRAM(s) Rectal every 6 hours PRN  morphine  - Injectable 2 milliGRAM(s) IV Push every 4 hours PRN  dextrose 5%. 1000 milliLiter(s) IV Continuous <Continuous>                       9.3    16.9  )-----------( x        ( 20 Jul 2017 06:11 )             26.6     07-20    144  |  104  |  29<H>  ----------------------------<  127<H>  3.3<L>   |  31  |  0.83    Ca    7.7<L>      20 Jul 2017 06:11  Phos  1.9     07-20  Mg     2.1     07-20  TPro  4.8<L>  /  Alb  2.4<L>  /  TBili  0.7  /  DBili  x   /  AST  78<H>  /  ALT  56  /  AlkPhos  71  07-20    CENTRAL LINE: Y   REMOVE: N    TAN: AB     REMOVE: N    A-LINE: N    GLOBAL ISSUE/BEST PRACTICE:  Analgesia: yes  Sedation: no  HOB elevation: yes  Stress ulcer prophylaxis: yes  VTE prophylaxis: yes  Glycemic control: yes  Nutrition: yes    CODE STATUS: DNR/DNI

## 2017-07-20 NOTE — PROGRESS NOTE ADULT - SUBJECTIVE AND OBJECTIVE BOX
[facility] - Maisha Sosa, Dante Huynh, [place] Silverio Savella  Office Number:  [phone]    Patient resting comfortably in bed in NAD. Off pressors as of this AM.  Discussing option of per bart, but patient is hemodynamically improved    Telemetry:  SR 70's    MEDICATIONS  (STANDING):  meropenem IVPB   IV Intermittent   meropenem IVPB 1000 milliGRAM(s) IV Intermittent every 12 hours  insulin lispro (HumaLOG) corrective regimen sliding scale   SubCutaneous every 6 hours  dextrose 5%. 1000 milliLiter(s) (50 mL/Hr) IV Continuous <Continuous>  dextrose 50% Injectable 12.5 Gram(s) IV Push once  dextrose 50% Injectable 25 Gram(s) IV Push once  dextrose 50% Injectable 25 Gram(s) IV Push once    MEDICATIONS  (PRN):  acetaminophen  Suppository 650 milliGRAM(s) Rectal every 6 hours PRN For Temp greater than 38 C (100.4 F)  dextrose Gel 1 Dose(s) Oral once PRN Blood Glucose LESS THAN 70 milliGRAM(s)/deciliter  glucagon  Injectable 1 milliGRAM(s) IntraMuscular once PRN Glucose LESS THAN 70 milligrams/deciliter  morphine  - Injectable 2 milliGRAM(s) IV Push every 4 hours PRN Mild Pain (1 - 3)      Allergies    No Known Allergies    Intolerances        Vital Signs Last 24 Hrs  T(C): 37.1 (20 Jul 2017 04:01), Max: 37.1 (19 Jul 2017 12:01)  T(F): 98.7 (20 Jul 2017 04:01), Max: 98.8 (19 Jul 2017 12:01)  HR: 71 (20 Jul 2017 07:00) (60 - 83)  BP: 98/54 (20 Jul 2017 07:00) (94/46 - 142/72)  BP(mean): 73 (20 Jul 2017 07:00) (66 - 102)  RR: 15 (20 Jul 2017 07:00) (14 - 22)  SpO2: 99% (20 Jul 2017 07:00) (93% - 100%)    I&O's Summary    19 Jul 2017 07:01  -  20 Jul 2017 07:00  --------------------------------------------------------  IN: 489.3 mL / OUT: 920 mL / NET: -430.7 mL        ON EXAM:    General: NAD,   HEENT: Mucous membranes are moist, anicteric, NGT in place  Lungs: Non-labored, CTA anteriortly, No wheezing, rales or rhonchi  Cardiovascular: Regular, S1 and S2, III/VI SM to RUSB, rubs, or gallops  Gastrointestinal: Bowel Sounds present, soft, nontender.   Lymph: Trace peripheral edema. No lymphadenopathy.  Skin: No rashes or ulcers  Psych:  Unable to assess    LABS: All Labs Reviewed:                        9.3    16.9  )-----------( x        ( 20 Jul 2017 06:11 )             26.6                         10.7   23.3  )-----------( 50       ( 19 Jul 2017 06:37 )             30.1                         8.0    30.2  )-----------( 70       ( 18 Jul 2017 09:04 )             23.0     20 Jul 2017 06:11    144    |  104    |  29     ----------------------------<  127    3.3     |  31     |  0.83   19 Jul 2017 06:37    143    |  103    |  31     ----------------------------<  139    3.5     |  31     |  1.00   18 Jul 2017 09:04    146    |  110    |  25     ----------------------------<  174    3.6     |  19     |  1.50     Ca    7.7        20 Jul 2017 06:11  Ca    7.9        19 Jul 2017 06:37  Ca    7.7        18 Jul 2017 09:04  Phos  1.9       20 Jul 2017 06:11  Phos  2.4       19 Jul 2017 06:37  Phos  2.7       18 Jul 2017 06:35  Mg     2.1       20 Jul 2017 06:11  Mg     1.9       19 Jul 2017 06:37  Mg     1.8       18 Jul 2017 06:35    TPro  4.8    /  Alb  2.4    /  TBili  0.7    /  DBili  x      /  AST  78     /  ALT  56     /  AlkPhos  71     20 Jul 2017 06:11  TPro  4.7    /  Alb  2.4    /  TBili  1.2    /  DBili  x      /  AST  99     /  ALT  60     /  AlkPhos  66     19 Jul 2017 06:37  TPro  4.7    /  Alb  2.4    /  TBili  0.9    /  DBili  x      /  AST  65     /  ALT  55     /  AlkPhos  53     18 Jul 2017 09:04    PT/INR - ( 18 Jul 2017 10:27 )   PT: 20.2 sec;   INR: 1.83 ratio         PTT - ( 18 Jul 2017 10:27 )  PTT:34.8 sec  CARDIAC MARKERS ( 18 Jul 2017 22:29 )  2.460 ng/mL / x     / 1498 U/L / x     / 27.1 ng/mL  CARDIAC MARKERS ( 18 Jul 2017 14:04 )  3.520 ng/mL / x     / 1151 U/L / x     / 31.2 ng/mL      Blood Culture:

## 2017-07-20 NOTE — PROGRESS NOTE ADULT - SUBJECTIVE AND OBJECTIVE BOX
Patient is a 82y old  Female who presents with a chief complaint of Pt passed out at home (18 Jul 2017 13:52)    PAST MEDICAL & SURGICAL HISTORY:  Aortic stenosis  Alzheimer disease  Meniscal injury: s/p arthroscopy    SUNIL BASILIO   82y    Female    BRIEF HOSPITAL COURSE:    Review of Systems:                       All other ROS are negative.    ICU Vital Signs Last 24 Hrs  T(C): 36.9 (20 Jul 2017 20:43), Max: 37.1 (20 Jul 2017 04:01)  T(F): 98.5 (20 Jul 2017 20:43), Max: 98.7 (20 Jul 2017 04:01)  HR: 83 (20 Jul 2017 23:00) (64 - 91)  BP: 101/52 (20 Jul 2017 23:00) (90/51 - 144/67)  BP(mean): 73 (20 Jul 2017 23:00) (68 - 102)  ABP: --  ABP(mean): --  RR: 11 (20 Jul 2017 23:00) (11 - 20)  SpO2: 100% (20 Jul 2017 23:00) (93% - 100%)    Physical Examination:    General:     HEENT:     PULM:     CVS:     ABD:     EXT:     SKIN:     Neuro:          LABS:                        9.3    16.9  )-----------( 43       ( 20 Jul 2017 06:11 )             26.6     07-20    144  |  104  |  29<H>  ----------------------------<  127<H>  3.3<L>   |  31  |  0.83    Ca    7.7<L>      20 Jul 2017 06:11  Phos  1.9     07-20  Mg     2.1     07-20    TPro  4.8<L>  /  Alb  2.4<L>  /  TBili  0.7  /  DBili  x   /  AST  78<H>  /  ALT  56  /  AlkPhos  71  07-20          CAPILLARY BLOOD GLUCOSE  122 (20 Jul 2017 23:15)              CULTURES:      Medications:  meropenem IVPB   IV Intermittent   meropenem IVPB 1000 milliGRAM(s) IV Intermittent every 12 hours  acetaminophen  Suppository 650 milliGRAM(s) Rectal every 6 hours PRN  morphine  - Injectable 2 milliGRAM(s) IV Push every 4 hours PRN  insulin lispro (HumaLOG) corrective regimen sliding scale   SubCutaneous every 6 hours  dextrose Gel 1 Dose(s) Oral once PRN  dextrose 50% Injectable 12.5 Gram(s) IV Push once  dextrose 50% Injectable 25 Gram(s) IV Push once  dextrose 50% Injectable 25 Gram(s) IV Push once  glucagon  Injectable 1 milliGRAM(s) IntraMuscular once PRN  dextrose 5%. 1000 milliLiter(s) IV Continuous <Continuous>  potassium acid phosphate/sodium acid phosphate tablet (K-PHOS No. 2) 1 Tablet(s) Oral four times a day with meals      07-19 @ 07:01  -  07-20 @ 07:00  --------------------------------------------------------  IN: 489.3 mL / OUT: 920 mL / NET: -430.7 mL    07-20 @ 07:01 - 07-20 @ 23:56  --------------------------------------------------------  IN: 570 mL / OUT: 410 mL / NET: 160 mL        RADIOLOGY/IMAGING/ECHO    Pelvis bilateral acetabular fx's         CT abd   Markedly distended gallbladder with pericholecystic fluid. Correlate with   right upper quadrant ultrasound  Nonspecific diffuse enteritis.  Bilateral pelvic fractures as described with associated   hematoma/hemorrhagic ascites in the lower pelvis.          Assessment/Plan:    82F PMH Advanced dementia  and critical aortic stenosis presents with episode of syncope/possible seizure with resolving septic shock source, suspected  acalculous cholecystitis though No RUQ tenderness appreciated. ICU course complicated by encephalopathy, ASHLEY, lactic acidosis, NSTEMI, and  found to have b/l acetabular fractures.  Shock resolved off pressor.  MS not back to baseline.  Her ASHLEY is improved with hydration.  D/W patients some MD.  Aware of the impact of bilateral acetabular fx and non weight bearing status on his mother        Minutes of Critical Care time:   (Reviewing data, imaging, discussing with multidisciplinary team, non inclusive of procedures, discussing goals of care with patient/family) Patient is a 82y old  Female who presents with a chief complaint of Pt passed out at home (18 Jul 2017 13:52)    PAST MEDICAL & SURGICAL HISTORY:  Aortic stenosis  Alzheimer disease  Meniscal injury: s/p arthroscopy    SUNIL BASILIO   82y    Female    BRIEF HOSPITAL COURSE:  S/P septic shock pressor off    Review of Systems:    UATO AMS dementia                       ICU Vital Signs Last 24 Hrs  T(C): 36.9 (20 Jul 2017 20:43), Max: 37.1 (20 Jul 2017 04:01)  T(F): 98.5 (20 Jul 2017 20:43), Max: 98.7 (20 Jul 2017 04:01)  HR: 83 (20 Jul 2017 23:00) (64 - 91)  BP: 101/52 (20 Jul 2017 23:00) (90/51 - 144/67)  BP(mean): 73 (20 Jul 2017 23:00) (68 - 102)  ABP: --  ABP(mean): --  RR: 11 (20 Jul 2017 23:00) (11 - 20)  SpO2: 100% (20 Jul 2017 23:00) (93% - 100%)    Physical Examination:    General:  poorly responsive      HEENT:  opens eyes no commands demented    PULM:  bilateral BS    CVS: rrr     ABD: soft ND  no grimace with palp    EXT: no edema     SKIN: warm no rash    Neuro: moves 4 ext to pain .          LABS:                        9.3    16.9  )-----------( 43       ( 20 Jul 2017 06:11 )             26.6     07-20    144  |  104  |  29<H>  ----------------------------<  127<H>  3.3<L>   |  31  |  0.83    Ca    7.7<L>      20 Jul 2017 06:11  Phos  1.9     07-20  Mg     2.1     07-20    TPro  4.8<L>  /  Alb  2.4<L>  /  TBili  0.7  /  DBili  x   /  AST  78<H>  /  ALT  56  /  AlkPhos  71  07-20      CULTURES:    Medications:  meropenem IVPB   IV Intermittent   meropenem IVPB 1000 milliGRAM(s) IV Intermittent every 12 hours  acetaminophen  Suppository 650 milliGRAM(s) Rectal every 6 hours PRN  morphine  - Injectable 2 milliGRAM(s) IV Push every 4 hours PRN  insulin lispro (HumaLOG) corrective regimen sliding scale   SubCutaneous every 6 hours  potassium acid phosphate/sodium acid phosphate tablet (K-PHOS No. 2) 1 Tablet(s) Oral four times a day with meals      07-19 @ 07:01  -  07-20 @ 07:00  --------------------------------------------------------  IN: 489.3 mL / OUT: 920 mL / NET: -430.7 mL    07-20 @ 07:01 - 07-20 @ 23:56  --------------------------------------------------------  IN: 570 mL / OUT: 410 mL / NET: 160 mL        RADIOLOGY/IMAGING/ECHO    Pelvis bilateral acetabular fx's         CT abd   Markedly distended gallbladder with pericholecystic fluid. Correlate with   right upper quadrant ultrasound  Nonspecific diffuse enteritis.  Bilateral pelvic fractures as described with associated   hematoma/hemorrhagic ascites in the lower pelvis.          Assessment/Plan:    82F PMH Advanced dementia  and critical aortic stenosis presents with episode of syncope/possible seizure with resolving septic shock source, suspected  acalculous cholecystitis though No RUQ tenderness appreciated. ICU course complicated by encephalopathy, ASHLEY, lactic acidosis, NSTEMI, and  found to have b/l acetabular fractures.  Shock resolved off pressor.  MS not back to baseline.  Her ASHLEY is improved with hydration (h20 and feeds).  D/W patients son MD.  Aware of the impact of bilateral acetabular fx and non weight bearing status being  bed bound and all risks associated.   Now  DNR/I  Continuing ABX for sepsis presumed GB source.  No plan fro perc bart at this point.

## 2017-07-20 NOTE — PROGRESS NOTE ADULT - ASSESSMENT
A/P: 82F PMH Advanced Alzheimer's dementia (nonverbal, 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    1. Neuro: encephalopathy likely related to sepsis, hold off on antiepileptics at this point no evidence of seizure. morphine PRN for pain   2. CV: likely distributive shock, weaned off of pressors currently stable vital. TTE showing critical aortic stenosis, c/w asa 81 qd.   3. Pulm: no active issues, c/w NC O2  4. GI: NPO for now, NG tube in place  5. Renal/Metabolic: D/C Bicarb drip,  Chandler now placed, strict I/Os, monitor BUN/Cr/K/HCO3  6. ID: Cholecystitis c/w meropenem,  7. Heme: DVT ppx, trend leukocytosis (likely due to sepsis vs. seizure)  8. Endo: no active issues  9. Skin: L heel ulcer, does not appear infected, wound care eval; bilateral acetabular fracture, NWB status per Ortho x 3months, lindsay lift for transfers  10. Dispo: DNR/DNI

## 2017-07-20 NOTE — CONSULT NOTE ADULT - CONSULT REQUESTED DATE/TIME
17-Jul-2017 12:30
17-Jul-2017 14:28
18-Jul-2016 10:30
18-Jul-2017
18-Jul-2017 08:20
18-Jul-2017 08:37
17-Jul-2017 19:54

## 2017-07-20 NOTE — CONSULT NOTE ADULT - SUBJECTIVE AND OBJECTIVE BOX
Pt in ICU for sepsis of unknown origin. Oral head and neck exam done to rule out dental etiology. No facial swelling. Intraorally, no signs of dental infection buccally or lingually. No fistulas. Gingival tissue pink and healthy. Full set of non mobile teeth  IMP- No dental infections noted

## 2017-07-20 NOTE — PROGRESS NOTE ADULT - ATTENDING COMMENTS
82F [facility] Advanced Alzheimer's dementia (nonverbal, fully dependent on ADL's) and severe aortic stenosis presents with episode of syncope/possible seizure with resolving septic shock suspected from acalculous cholecystitis, complicated by encephalopathy, ASHLEY, lactic acidosis, NSTEMI, and  found to have b/l acetabular fractures.    --encephalopathy persists, no evidence of current seizure  --septic shock resolved  --stable respiratory status  --ASHLEY improving  --suspected acalculous cholecystitis, not an operative candidate, only possibility perc cholecystostomy tube, continue meropenem  --b/l acetabular fractures, non-operative management, ? from fall v seizure  --family meeting with , son and daughter.  Given resolution of septic shock, family in agreement to give trial of conservative treatment with Abx, tube feeding to see if mental status may improve.  They understand that if pt survives this acute illness, she will have a poor quality of life and hospice care is appropriate.  Hospice eval called. Remains DNR/DNI.  --pt critically ill, CC time 90min 82F [facility] Advanced Alzheimer's dementia (nonverbal, fully dependent on ADL's) and severe aortic stenosis presents with episode of syncope/possible seizure with resolving septic shock suspected from acalculous cholecystitis, complicated by encephalopathy, ASHLEY, lactic acidosis, NSTEMI, and  found to have b/l acetabular fractures.    --encephalopathy persists, no evidence of current seizure  --septic shock resolved  --stable respiratory status  --ASHLEY improving  --suspected acalculous cholecystitis, not an operative candidate, only possibility perc cholecystostomy tube, continue meropenem  --b/l acetabular fractures, non-operative management, ? from fall v seizure  --family meeting with , son and daughter.  Given resolution of septic shock, family in agreement to give trial of conservative treatment with Abx, tube feeding to see if mental status may improve.  They understand that if pt survives this acute illness, she will have a poor quality of life and hospice care is appropriate.  Hospice eval called. Remains DNR/DNI.  --pt critically ill, CC time 40min

## 2017-07-21 LAB
ALBUMIN SERPL ELPH-MCNC: 2.3 G/DL — LOW (ref 3.3–5)
ALP SERPL-CCNC: 78 U/L — SIGNIFICANT CHANGE UP (ref 40–120)
ALT FLD-CCNC: 63 U/L — SIGNIFICANT CHANGE UP (ref 12–78)
ANION GAP SERPL CALC-SCNC: 4 MMOL/L — LOW (ref 5–17)
AST SERPL-CCNC: 72 U/L — HIGH (ref 15–37)
BASOPHILS # BLD AUTO: 0.1 K/UL — SIGNIFICANT CHANGE UP (ref 0–0.2)
BASOPHILS NFR BLD AUTO: 0.7 % — SIGNIFICANT CHANGE UP (ref 0–2)
BILIRUB SERPL-MCNC: 0.7 MG/DL — SIGNIFICANT CHANGE UP (ref 0.2–1.2)
BUN SERPL-MCNC: 25 MG/DL — HIGH (ref 7–23)
CALCIUM SERPL-MCNC: 8 MG/DL — LOW (ref 8.5–10.1)
CHLORIDE SERPL-SCNC: 109 MMOL/L — HIGH (ref 96–108)
CO2 SERPL-SCNC: 35 MMOL/L — HIGH (ref 22–31)
CREAT SERPL-MCNC: 0.64 MG/DL — SIGNIFICANT CHANGE UP (ref 0.5–1.3)
EOSINOPHIL # BLD AUTO: 0.2 K/UL — SIGNIFICANT CHANGE UP (ref 0–0.5)
EOSINOPHIL NFR BLD AUTO: 1 % — SIGNIFICANT CHANGE UP (ref 0–6)
GLUCOSE SERPL-MCNC: 108 MG/DL — HIGH (ref 70–99)
HCT VFR BLD CALC: 25.3 % — LOW (ref 34.5–45)
HGB BLD-MCNC: 8.9 G/DL — LOW (ref 11.5–15.5)
LYMPHOCYTES # BLD AUTO: 2 K/UL — SIGNIFICANT CHANGE UP (ref 1–3.3)
LYMPHOCYTES # BLD AUTO: 9 % — LOW (ref 13–44)
MAGNESIUM SERPL-MCNC: 2.3 MG/DL — SIGNIFICANT CHANGE UP (ref 1.6–2.6)
MCHC RBC-ENTMCNC: 32.6 PG — SIGNIFICANT CHANGE UP (ref 27–34)
MCHC RBC-ENTMCNC: 35 GM/DL — SIGNIFICANT CHANGE UP (ref 32–36)
MCV RBC AUTO: 93 FL — SIGNIFICANT CHANGE UP (ref 80–100)
MONOCYTES # BLD AUTO: 1 K/UL — HIGH (ref 0–0.9)
MONOCYTES NFR BLD AUTO: 4 % — SIGNIFICANT CHANGE UP (ref 1–9)
NEUTROPHILS # BLD AUTO: 12.3 K/UL — HIGH (ref 1.8–7.4)
NEUTROPHILS NFR BLD AUTO: 81 % — HIGH (ref 43–77)
PHOSPHATE SERPL-MCNC: 1 MG/DL — CRITICAL LOW (ref 2.5–4.5)
PLATELET # BLD AUTO: 55 K/UL — LOW (ref 150–400)
POTASSIUM SERPL-MCNC: 3.3 MMOL/L — LOW (ref 3.5–5.3)
POTASSIUM SERPL-SCNC: 3.3 MMOL/L — LOW (ref 3.5–5.3)
PROT SERPL-MCNC: 4.8 G/DL — LOW (ref 6–8.3)
RBC # BLD: 2.72 M/UL — LOW (ref 3.8–5.2)
RBC # FLD: 13.7 % — SIGNIFICANT CHANGE UP (ref 10.3–14.5)
SODIUM SERPL-SCNC: 148 MMOL/L — HIGH (ref 135–145)
WBC # BLD: 15.6 K/UL — HIGH (ref 3.8–10.5)
WBC # FLD AUTO: 15.6 K/UL — HIGH (ref 3.8–10.5)

## 2017-07-21 PROCEDURE — 99233 SBSQ HOSP IP/OBS HIGH 50: CPT | Mod: GC

## 2017-07-21 PROCEDURE — 99291 CRITICAL CARE FIRST HOUR: CPT

## 2017-07-21 RX ORDER — LANOLIN ALCOHOL/MO/W.PET/CERES
3 CREAM (GRAM) TOPICAL ONCE
Qty: 0 | Refills: 0 | Status: DISCONTINUED | OUTPATIENT
Start: 2017-07-21 | End: 2017-07-21

## 2017-07-21 RX ORDER — MORPHINE SULFATE 50 MG/1
2 CAPSULE, EXTENDED RELEASE ORAL
Qty: 0 | Refills: 0 | Status: DISCONTINUED | OUTPATIENT
Start: 2017-07-21 | End: 2017-07-27

## 2017-07-21 RX ORDER — POTASSIUM PHOSPHATE, MONOBASIC POTASSIUM PHOSPHATE, DIBASIC 236; 224 MG/ML; MG/ML
15 INJECTION, SOLUTION INTRAVENOUS EVERY 6 HOURS
Qty: 0 | Refills: 0 | Status: COMPLETED | OUTPATIENT
Start: 2017-07-21 | End: 2017-07-21

## 2017-07-21 RX ORDER — MORPHINE SULFATE 50 MG/1
2 CAPSULE, EXTENDED RELEASE ORAL
Qty: 0 | Refills: 0 | Status: DISCONTINUED | OUTPATIENT
Start: 2017-07-21 | End: 2017-07-21

## 2017-07-21 RX ORDER — SCOPALAMINE 1 MG/3D
1.5 PATCH, EXTENDED RELEASE TRANSDERMAL
Qty: 0 | Refills: 0 | Status: DISCONTINUED | OUTPATIENT
Start: 2017-07-21 | End: 2017-07-27

## 2017-07-21 RX ADMIN — SCOPALAMINE 1.5 MILLIGRAM(S): 1 PATCH, EXTENDED RELEASE TRANSDERMAL at 23:29

## 2017-07-21 RX ADMIN — Medication 1 TABLET(S): at 07:46

## 2017-07-21 RX ADMIN — POTASSIUM PHOSPHATE, MONOBASIC POTASSIUM PHOSPHATE, DIBASIC 63.75 MILLIMOLE(S): 236; 224 INJECTION, SOLUTION INTRAVENOUS at 07:56

## 2017-07-21 RX ADMIN — Medication 1 TABLET(S): at 11:10

## 2017-07-21 RX ADMIN — MORPHINE SULFATE 2 MILLIGRAM(S): 50 CAPSULE, EXTENDED RELEASE ORAL at 23:05

## 2017-07-21 RX ADMIN — MEROPENEM 200 MILLIGRAM(S): 1 INJECTION INTRAVENOUS at 05:17

## 2017-07-21 RX ADMIN — MORPHINE SULFATE 2 MILLIGRAM(S): 50 CAPSULE, EXTENDED RELEASE ORAL at 16:59

## 2017-07-21 RX ADMIN — MORPHINE SULFATE 2 MILLIGRAM(S): 50 CAPSULE, EXTENDED RELEASE ORAL at 17:12

## 2017-07-21 RX ADMIN — MORPHINE SULFATE 2 MILLIGRAM(S): 50 CAPSULE, EXTENDED RELEASE ORAL at 22:39

## 2017-07-21 RX ADMIN — POTASSIUM PHOSPHATE, MONOBASIC POTASSIUM PHOSPHATE, DIBASIC 63.75 MILLIMOLE(S): 236; 224 INJECTION, SOLUTION INTRAVENOUS at 13:27

## 2017-07-21 NOTE — PROGRESS NOTE ADULT - SUBJECTIVE AND OBJECTIVE BOX
Follow up: AS, syncope, shock    HPI:  83 y/o F with PMHx of Alzheimer Dementia, aortic stenosis presented to the ED with syncope and hypotension. Patient lives at home with  and 24 hour aide Romy. Per aide, patient was getting a sponge bath when her arms became stiff. She was on the commode and began "shaking violently," and held her hand to her chest and . Patient is non-verbal so  and aide were unsure if she was in pain or this was an involuntary motion. Family states that her current level of consciousness is her baseline. Family states that she has been grinding her teeth for the last 6 months, and was grinding it more intensely on the day prior to admission. Patient is incontinent of urine at baseline, using diapers. Family denies patient being febrile. Had one episode of nonbloody, nonbilious vomiting in the ED. Of note, patient was on depakote for Alzheimer dementia and not for seizures. Has been off depakote for the last year.      She is being treated for sepsis with encephalopathy possibly due to acalculus cholecystitis. She also has bilateral acetabular fractures with her fall. She is currently off pressors but remains obtunded.      PAST MEDICAL & SURGICAL HISTORY:  Aortic stenosis  Alzheimer disease  Meniscal injury: s/p arthroscopy      MEDICATIONS  (STANDING):  meropenem IVPB   IV Intermittent   meropenem IVPB 1000 milliGRAM(s) IV Intermittent every 12 hours  insulin lispro (HumaLOG) corrective regimen sliding scale   SubCutaneous every 6 hours  dextrose 5%. 1000 milliLiter(s) (50 mL/Hr) IV Continuous <Continuous>  dextrose 50% Injectable 12.5 Gram(s) IV Push once  dextrose 50% Injectable 25 Gram(s) IV Push once  dextrose 50% Injectable 25 Gram(s) IV Push once  potassium acid phosphate/sodium acid phosphate tablet (K-PHOS No. 2) 1 Tablet(s) Oral four times a day with meals  potassium phosphate IVPB 15 milliMole(s) IV Intermittent every 6 hours    MEDICATIONS  (PRN):  acetaminophen  Suppository 650 milliGRAM(s) Rectal every 6 hours PRN For Temp greater than 38 C (100.4 F)  dextrose Gel 1 Dose(s) Oral once PRN Blood Glucose LESS THAN 70 milliGRAM(s)/deciliter  glucagon  Injectable 1 milliGRAM(s) IntraMuscular once PRN Glucose LESS THAN 70 milligrams/deciliter  morphine  - Injectable 2 milliGRAM(s) IV Push every 4 hours PRN Mild Pain (1 - 3)      Vital Signs Last 24 Hrs  T(C): 36.9 (21 Jul 2017 04:00), Max: 36.9 (20 Jul 2017 20:43)  T(F): 98.4 (21 Jul 2017 04:00), Max: 98.5 (20 Jul 2017 20:43)  HR: 85 (21 Jul 2017 07:00) (67 - 91)  BP: 119/57 (21 Jul 2017 07:00) (85/52 - 144/67)  BP(mean): 82 (21 Jul 2017 07:00) (64 - 100)  RR: 11 (21 Jul 2017 07:00) (10 - 16)  SpO2: 100% (21 Jul 2017 07:00) (93% - 100%)    I&O's Summary    20 Jul 2017 07:01  -  21 Jul 2017 07:00  --------------------------------------------------------  IN: 1250 mL / OUT: 740 mL / NET: 510 mL        PHYSICAL EXAM:    Constitutional: obtunded  Eyes:   Pupils round, no lesions  ENMT: no exudate or erythema  Pulmonary: breath sounds are clear bilaterally, No wheezing, rales or rhonchi  Cardiovascular: PMI not palpable RRR soft S1 absent S2,, 3/6 sys murmer of AS, no rubs, gallops or clicks; trc edema legs  Gastrointestinal: Bowel Sounds present, soft, nontender.   Lymph:  No cervical lymphadenopathy.  Neurological: obtunded no focal deficits  Skin: No rashes. Changes of chronic venous stasis. No cyanosis.  Psych: cannot assess                                8.9    15.6  )-----------( 55       ( 21 Jul 2017 06:03 )             25.3     CBC Full  -  ( 21 Jul 2017 06:03 )  WBC Count : 15.6 K/uL  Hemoglobin : 8.9 g/dL  Hematocrit : 25.3 %  Platelet Count - Automated : 55 K/uL  Mean Cell Volume : 93.0 fl  Mean Cell Hemoglobin : 32.6 pg  Mean Cell Hemoglobin Concentration : 35.0 gm/dL  Auto Neutrophil # : 12.3 K/uL  Auto Lymphocyte # : 2.0 K/uL  Auto Monocyte # : 1.0 K/uL  Auto Eosinophil # : 0.2 K/uL  Auto Basophil # : 0.1 K/uL  Auto Neutrophil % : 81.0 %  Auto Lymphocyte % : 9.0 %  Auto Monocyte % : 4.0 %  Auto Eosinophil % : 1.0 %  Auto Basophil % : 0.7 %    07-21    148<H>  |  109<H>  |  25<H>  ----------------------------<  108<H>  3.3<L>   |  35<H>  |  0.64    Ca    8.0<L>      21 Jul 2017 06:03  Phos  1.0     07-21  Mg     2.3     07-21    TPro  4.8<L>  /  Alb  2.3<L>  /  TBili  0.7  /  DBili  x   /  AST  72<H>  /  ALT  63  /  AlkPhos  78  07-21    < from: 12 Lead ECG (07.18.17 @ 08:26) >    Ventricular Rate 110 BPM    Atrial Rate 110 BPM    P-R Interval 126 ms    QRS Duration 84 ms    Q-T Interval 484 ms    QTC Calculation(Bezet) 655 ms    P Axis 73 degrees    R Axis -4 degrees    T Axis 105 degrees    Diagnosis Line Sinus tachycardia  ST & T wave abnormality, consider lateral ischemia  Left ventricular hypertrophy  Confirmed by PRAKASH DUEÑAS ([id number]) on 7/19/2017 11:42:58 AM    < end of copied text >  < from: TTE Echo Doppler w/o Cont (07.18.17 @ 10:57) >     EXAM:  ECHO TTE W/O CON COMP W/DOPPLR         PROCEDURE DATE:  07/18/2017        INTERPRETATION:  Ordering Physician: DARYL A PERLMAN [id number]    Indication: Aortic stenosis    Study Quality: Technically difficult  A complete echocardiographic study was performed utilizing standard   protocol including spectral and color Doppler in all echocardiographic   windows.    Weight: 66 kg  Blood Pressure: 104/55    MEASUREMENTS  IVS: 1.5cm  PWT: 1.3cm  LA: 2.8cm  AO: 3.0cm  LVIDd: 4.0cm  LVIDs: 2.6cm    LVEF: 65%  RVSP: 66mmHg    FINDINGS  Left Ventricle: Concentric left ventricular hypertrophy. Normal left   ventricular systolic function.  Aortic Valve: Heavily Calcified aortic valve with decreased opening. Mild   aortic insufficiency. Peak transaortic gradient equals 100 mmHg, and mean   transaortic gradient equals 67 mmHg. The aortic valve area by continuity   equation is 0.4 sq cm, consistent with critical aortic stenosis.  Mitral Valve: Mitral annular calcification. Otherwise normal mitral valve.  Tricuspid Valve: Tricuspid valve. Mild tricuspid insufficiency.  Pulmonic Valve: Normal pulmonic valve. Minimal pulmonic insufficiency.  Left Atrium: Enlarged  Right Ventricle: Right ventricular enlargement with borderline right   ventricular systolic function.  Right Atrium: Mildly enlarged  Diastolic Function: Grade 1 diastolic dysfunction.  Pericardium/Pleura: Normal pericardium with no pericardial effusion.   Bilateral pleural effusions.                    PRAKASH BARONE M.D., ATTENDING CARDIOLOGIST  This document has been electronically signed. Jul 19 2017 11:26AM                < end of copied text >    < from: Xray Chest 1 View AP- PORTABLE-Urgent (07.18.17 @ 14:45) >    EXAM:  PORTABLE CHEST URGENT                            PROCEDURE DATE:  07/18/2017          INTERPRETATION:  NG tube placement.    AP chest. Prior 7/18/2017.    Nasogastric tube tip in the stomach. Right internal jugular line   reidentified in position. Mild bibasilar atelectasis. Possible trace left   pleural effusion.    Impression: As above                CHANCE MORGAN M.D., ATTENDING RADIOLOGIST  This document has been electronically signed. Jul 18 2017  2:53PM                < end of copied text >

## 2017-07-21 NOTE — PROGRESS NOTE ADULT - SUBJECTIVE AND OBJECTIVE BOX
infectious diseases progress note:    SUNIL BASILIO is a 82y y. o. Female patient    Patient minimally responsive    Allergies    No Known Allergies    Intolerances        ANTIBIOTICS/RELEVANT:  antimicrobials  meropenem IVPB   IV Intermittent   meropenem IVPB 1000 milliGRAM(s) IV Intermittent every 12 hours    immunologic:    OTHER:  acetaminophen  Suppository 650 milliGRAM(s) Rectal every 6 hours PRN  insulin lispro (HumaLOG) corrective regimen sliding scale   SubCutaneous every 6 hours  dextrose 5%. 1000 milliLiter(s) IV Continuous <Continuous>  dextrose Gel 1 Dose(s) Oral once PRN  dextrose 50% Injectable 12.5 Gram(s) IV Push once  dextrose 50% Injectable 25 Gram(s) IV Push once  dextrose 50% Injectable 25 Gram(s) IV Push once  glucagon  Injectable 1 milliGRAM(s) IntraMuscular once PRN  morphine  - Injectable 2 milliGRAM(s) IV Push every 4 hours PRN  potassium acid phosphate/sodium acid phosphate tablet (K-PHOS No. 2) 1 Tablet(s) Oral four times a day with meals  potassium phosphate IVPB 15 milliMole(s) IV Intermittent every 6 hours      Objective:  Vital Signs Last 24 Hrs  T(C): 36.9 (21 Jul 2017 04:00), Max: 36.9 (20 Jul 2017 20:43)  T(F): 98.4 (21 Jul 2017 04:00), Max: 98.5 (20 Jul 2017 20:43)  HR: 85 (21 Jul 2017 07:00) (67 - 91)  BP: 119/57 (21 Jul 2017 07:00) (85/52 - 144/67)  BP(mean): 82 (21 Jul 2017 07:00) (64 - 100)  RR: 11 (21 Jul 2017 07:00) (10 - 16)  SpO2: 100% (21 Jul 2017 07:00) (93% - 100%)    T(C): 36.9 (21 Jul 2017 04:00), Max: 37.1 (19 Jul 2017 12:01)  T(C): 36.9 (21 Jul 2017 04:00), Max: 37.1 (19 Jul 2017 12:01)  T(C): 36.9 (21 Jul 2017 04:00), Max: 37.1 (17 Jul 2017 09:50)    PHYSICAL EXAM:  Constitutional:Well-developed, well nourished  Eyes:PERRLA, EOMI  Ear/Nose/Throat: oropharynx normal	  Neck:no JVD, no lymphadenopathy, supple  Respiratory: no accessory muscle use  Cardiovascular:RRR,   Gastrointestinal:soft, NT  Extremities:no clubbing, no cyanosis, edema minimal      LABS:                        8.9    15.6  )-----------( 55       ( 21 Jul 2017 06:03 )             25.3       15.6 07-21 @ 06:03  16.9 07-20 @ 06:11  23.3 07-19 @ 06:37  30.2 07-18 @ 09:04  21.5 07-18 @ 06:35  24.6 07-17 @ 09:59      07-21    148<H>  |  109<H>  |  25<H>  ----------------------------<  108<H>  3.3<L>   |  35<H>  |  0.64    Ca    8.0<L>      21 Jul 2017 06:03  Phos  1.0     07-21  Mg     2.3     07-21    TPro  4.8<L>  /  Alb  2.3<L>  /  TBili  0.7  /  DBili  x   /  AST  72<H>  /  ALT  63  /  AlkPhos  78  07-21            MICROBIOLOGY:          RADIOLOGY & ADDITIONAL STUDIES:

## 2017-07-21 NOTE — PROGRESS NOTE ADULT - ASSESSMENT
A/P: 82F PMH Advanced Alzheimer's dementia (nonverbal, 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  1. Neuro: encephalopathy likely related to sepsis, hold off on antiepileptics at this point no evidence of seizure. morphine PRN for pain   2. CV: likely distributive shock, weaned off of pressors currently stable vital. TTE showing critical aortic stenosis, c/w asa 81 qd.   3. Pulm: no active issues, c/w NC O2  4. GI: NPO for now, NG tube in place with tube feeding initiated yesterday  5. Renal/Metabolic: Chandler in plac, strict I/Os, monitor BUN/Cr/K/HCO3, replenish potassium and phosphate today  6. ID: Cholecystitis c/w meropenem,  7. Heme: DVT ppx, trend leukocytosis (likely due to sepsis vs. seizure)  8. Endo: no active issues  9. Skin: L heel ulcer, does not appear infected, wound care eval; bilateral acetabular fracture, NWB status per Ortho x 3months, lindsay lift for transfers   Dispo: DNR/DNI

## 2017-07-21 NOTE — PROGRESS NOTE ADULT - SUBJECTIVE AND OBJECTIVE BOX
Interval events: no acute events overnight, patient with stable vital signs, baseline mentation non-responsive/unchanged    Review of Systems:  unable to assess due to patient status    T(F): 98.4 (07-21-17 @ 04:00), Max: 98.5 (07-20-17 @ 20:43)  HR: 85 (07-21-17 @ 07:00) (67 - 91)  BP: 119/57 (07-21-17 @ 07:00) (85/52 - 144/67)  RR: 11 (07-21-17 @ 07:00) (10 - 16)  SpO2: 100% (07-21-17 @ 07:00) (93% - 100%)  Wt(kg): --    CAPILLARY BLOOD GLUCOSE  112 (21 Jul 2017 06:00)    I&O's Summary    20 Jul 2017 07:01  -  21 Jul 2017 07:00  --------------------------------------------------------  IN: 1250 mL / OUT: 740 mL / NET: 510 mL    Physical Exam:     Gen: resting in NAD  Neuro: unable to assess due to patient status, minimally responsive to pain, not responsive to verbal  HEENT: mucous membranes moist, NCAT  CV: regular rate and rhythm systolic ejection murmur  Pulm: CTAB, no wheezes rhonchi or rales  GI: Abdomen soft, +BS  Ext: distal pulses intact, no edema  Skin: extensive ecchymosis noted to the bilateral UE, 1 cm ulcer with clean base on left heel    Meds:  meropenem IVPB   IV Intermittent   meropenem IVPB 1000 milliGRAM(s) IV Intermittent every 12 hours  insulin lispro (HumaLOG) corrective regimen sliding scale   SubCutaneous every 6 hours  dextrose Gel 1 Dose(s) Oral once PRN  dextrose 50% Injectable 12.5 Gram(s) IV Push once  dextrose 50% Injectable 25 Gram(s) IV Push once  dextrose 50% Injectable 25 Gram(s) IV Push once  glucagon  Injectable 1 milliGRAM(s) IntraMuscular once PRN  acetaminophen  Suppository 650 milliGRAM(s) Rectal every 6 hours PRN  morphine  - Injectable 2 milliGRAM(s) IV Push every 4 hours PRN  dextrose 5%. 1000 milliLiter(s) IV Continuous <Continuous>  potassium acid phosphate/sodium acid phosphate tablet (K-PHOS No. 2) 1 Tablet(s) Oral four times a day with meals  potassium phosphate IVPB 15 milliMole(s) IV Intermittent every 6 hours                        8.9    15.6  )-----------( 55       ( 21 Jul 2017 06:03 )             25.3     Bands 5.0    07-21    148<H>  |  109<H>  |  25<H>  ----------------------------<  108<H>  3.3<L>   |  35<H>  |  0.64    Ca    8.0<L>      21 Jul 2017 06:03  Phos  1.0     07-21  Mg     2.3     07-21    TPro  4.8<L>  /  Alb  2.3<L>  /  TBili  0.7  /  DBili  x   /  AST  72<H>  /  ALT  63  /  AlkPhos  78  07-21      CENTRAL LINE: Y REMOVE: N    TAN: Y     REMOVE: N    A-LINE: N      GLOBAL ISSUE/BEST PRACTICE:  Analgesia: yes  Sedation: no  HOB elevation: yes  Stress ulcer prophylaxis: no  VTE prophylaxis: yes  Glycemic control: yes  Nutrition: yes    CODE STATUS: DNR/DNI Interval events: no acute events overnight, patient with stable vital signs, baseline mentation non-responsive/unchanged    Review of Systems:  unable to assess due to patient status    T(F): 98.4 (07-21-17 @ 04:00), Max: 98.5 (07-20-17 @ 20:43)  HR: 85 (07-21-17 @ 07:00) (67 - 91)  BP: 119/57 (07-21-17 @ 07:00) (85/52 - 144/67)  RR: 11 (07-21-17 @ 07:00) (10 - 16)  SpO2: 100% (07-21-17 @ 07:00) (93% - 100%)        CAPILLARY BLOOD GLUCOSE  112 (21 Jul 2017 06:00)    I&O's Summary    20 Jul 2017 07:01  -  21 Jul 2017 07:00  --------------------------------------------------------  IN: 1250 mL / OUT: 740 mL / NET: 510 mL    Physical Exam:     Gen: resting in NAD  Neuro: unable to assess due to patient status, minimally responsive to pain, not responsive to verbal  HEENT: mucous membranes moist, NCAT  CV: regular rate and rhythm systolic ejection murmur  Pulm: CTAB, no wheezes rhonchi or rales  GI: Abdomen soft, +BS  Ext: distal pulses intact, no edema  Skin: extensive ecchymosis noted to the bilateral UE, 1 cm ulcer with clean base on left heel    Meds:  meropenem IVPB   IV Intermittent   meropenem IVPB 1000 milliGRAM(s) IV Intermittent every 12 hours  insulin lispro (HumaLOG) corrective regimen sliding scale   SubCutaneous every 6 hours  dextrose Gel 1 Dose(s) Oral once PRN  dextrose 50% Injectable 12.5 Gram(s) IV Push once  dextrose 50% Injectable 25 Gram(s) IV Push once  dextrose 50% Injectable 25 Gram(s) IV Push once  glucagon  Injectable 1 milliGRAM(s) IntraMuscular once PRN  acetaminophen  Suppository 650 milliGRAM(s) Rectal every 6 hours PRN  morphine  - Injectable 2 milliGRAM(s) IV Push every 4 hours PRN  dextrose 5%. 1000 milliLiter(s) IV Continuous <Continuous>  potassium acid phosphate/sodium acid phosphate tablet (K-PHOS No. 2) 1 Tablet(s) Oral four times a day with meals  potassium phosphate IVPB 15 milliMole(s) IV Intermittent every 6 hours                        8.9    15.6  )-----------( 55       ( 21 Jul 2017 06:03 )             25.3     Bands 5.0    07-21    148<H>  |  109<H>  |  25<H>  ----------------------------<  108<H>  3.3<L>   |  35<H>  |  0.64    Ca    8.0<L>      21 Jul 2017 06:03  Phos  1.0     07-21  Mg     2.3     07-21    TPro  4.8<L>  /  Alb  2.3<L>  /  TBili  0.7  /  DBili  x   /  AST  72<H>  /  ALT  63  /  AlkPhos  78  07-21      CENTRAL LINE: Y REMOVE: N    TAN: AB     REMOVE: N    A-LINE: N      GLOBAL ISSUE/BEST PRACTICE:  Analgesia: yes  Sedation: no  HOB elevation: yes  Stress ulcer prophylaxis: no  VTE prophylaxis: yes  Glycemic control: yes  Nutrition: yes    CODE STATUS: DNR/DNI Interval events: no acute events overnight, patient with stable vital signs, baseline mentation non-responsive/unchanged    Review of Systems:  unable to assess due to patient status    T(F): 98.4 (07-21-17 @ 04:00), Max: 98.5 (07-20-17 @ 20:43)  HR: 85 (07-21-17 @ 07:00) (67 - 91)  BP: 119/57 (07-21-17 @ 07:00) (85/52 - 144/67)  RR: 11 (07-21-17 @ 07:00) (10 - 16)  SpO2: 100% (07-21-17 @ 07:00) (93% - 100%)      CAPILLARY BLOOD GLUCOSE  112 (21 Jul 2017 06:00)    I&O's Summary    20 Jul 2017 07:01  -  21 Jul 2017 07:00  --------------------------------------------------------  IN: 1250 mL / OUT: 740 mL / NET: 510 mL    Physical Exam:     Gen: resting in NAD  Neuro: unable to assess due to patient status, minimally responsive to pain, not responsive to verbal  HEENT: mucous membranes moist, NCAT  CV: regular rate and rhythm systolic ejection murmur  Pulm: CTAB, no wheezes rhonchi or rales  GI: Abdomen soft, +BS  Ext: distal pulses intact, no edema  Skin: extensive ecchymosis noted to the bilateral UE, 1 cm ulcer with clean base on left heel    Meds:  meropenem IVPB   IV Intermittent   meropenem IVPB 1000 milliGRAM(s) IV Intermittent every 12 hours  insulin lispro (HumaLOG) corrective regimen sliding scale   SubCutaneous every 6 hours  dextrose Gel 1 Dose(s) Oral once PRN  dextrose 50% Injectable 12.5 Gram(s) IV Push once  dextrose 50% Injectable 25 Gram(s) IV Push once  dextrose 50% Injectable 25 Gram(s) IV Push once  glucagon  Injectable 1 milliGRAM(s) IntraMuscular once PRN  acetaminophen  Suppository 650 milliGRAM(s) Rectal every 6 hours PRN  morphine  - Injectable 2 milliGRAM(s) IV Push every 4 hours PRN  dextrose 5%. 1000 milliLiter(s) IV Continuous <Continuous>  potassium acid phosphate/sodium acid phosphate tablet (K-PHOS No. 2) 1 Tablet(s) Oral four times a day with meals  potassium phosphate IVPB 15 milliMole(s) IV Intermittent every 6 hours                        8.9    15.6  )-----------( 55       ( 21 Jul 2017 06:03 )             25.3     Bands 5.0    07-21    148<H>  |  109<H>  |  25<H>  ----------------------------<  108<H>  3.3<L>   |  35<H>  |  0.64    Ca    8.0<L>      21 Jul 2017 06:03  Phos  1.0     07-21  Mg     2.3     07-21    TPro  4.8<L>  /  Alb  2.3<L>  /  TBili  0.7  /  DBili  x   /  AST  72<H>  /  ALT  63  /  AlkPhos  78  07-21      CENTRAL LINE: Y REMOVE: N    TAN: AB     REMOVE: N    A-LINE: N      GLOBAL ISSUE/BEST PRACTICE:  Analgesia: yes  Sedation: no  HOB elevation: yes  Stress ulcer prophylaxis: no  VTE prophylaxis: yes  Glycemic control: yes  Nutrition: yes    CODE STATUS: DNR/DNI

## 2017-07-21 NOTE — PROGRESS NOTE ADULT - PROBLEM SELECTOR PLAN 1
as long as patient is not made comfort care will recommend continuing through course of antibiotics. Without drainage the actual course duration is not well defined so would be based on clinical response. Unless decision is made to change to comfort care/hospice recommend course of abx for 2 weeks or until 8/1    Thank you for consulting us and involving us in the management of this most interesting and challenging case.     Please Call with any further questions

## 2017-07-21 NOTE — PROGRESS NOTE ADULT - ASSESSMENT
83 y/o F with PMHx of Alzheimer Dementia, severe aortic stenosis, presented to the ED with syncope and hypotension, now with sepsis with multiorgan involvement and hypoperfusion, GB thought to be the source, for potential percutaneous cholecystostomy tube, but now off pressors.    -TTE with evidence of critical AS  - off pressors at current time, without perc bart   - CE trend could possibly be related to ischemia.  CK and MB out of proportion for an isolated coronary etiology, but troponin trend could be consistent with a small NSTEMI, in the setting of her primary issue  - Aspirin stopped for hemorrhagic ascites seen on abdominal/pelvic CT  - Abx per primary for septic shock  - Follow H/H, transfuse as necessary.  - No evidence of significant volume overload or uncontrolled arrhythmia.  Monitor closely for ADHF given severe AS  - Monitor and replete electrolytes. Keep K>4.0 and Mg>2.0.   - Further cardiac workup will depend on clinical course.   - All other workup per primary team. Will followup.   - Patient at high risk for periprocedural cardiac complications given clinical condition and comorbidities  - Pt with poor overall long term prognosis; Now DNR  - Patient at high risk for decompensation. Critically ill. >35 minutes of critical care time was spent with this patient.

## 2017-07-21 NOTE — PROGRESS NOTE ADULT - ATTENDING COMMENTS
82F [facility] Advanced Alzheimer's dementia (nonverbal, fully dependent on ADL's) and severe aortic stenosis presents with episode of syncope/possible seizure with resolving septic shock suspected from acalculous cholecystitis, complicated by multiorgan failure, and b/l acetabular fractures.    --encephalopathy persists, no evidence of current seizure  --septic shock resolved  --stable respiratory status  --ASHLEY resolved, increase free H2O for hypernatremia  --suspected acalculous cholecystitis, family has decided not to pursue interventional procedure, continue meropenem for 2wks  --b/l acetabular fractures, suspect from seizure at home, non-operative management,   --continue TF   --hospice eval.  Will continue trial of Abx, TF, free water to see if may improve mental state.  Regardless family still would like to pursue hospice care.    DNR/DNI

## 2017-07-22 DIAGNOSIS — D64.9 ANEMIA, UNSPECIFIED: ICD-10-CM

## 2017-07-22 LAB
CULTURE RESULTS: SIGNIFICANT CHANGE UP
CULTURE RESULTS: SIGNIFICANT CHANGE UP
SPECIMEN SOURCE: SIGNIFICANT CHANGE UP
SPECIMEN SOURCE: SIGNIFICANT CHANGE UP

## 2017-07-22 PROCEDURE — 99232 SBSQ HOSP IP/OBS MODERATE 35: CPT

## 2017-07-22 RX ORDER — MORPHINE SULFATE 50 MG/1
2 CAPSULE, EXTENDED RELEASE ORAL EVERY 6 HOURS
Qty: 0 | Refills: 0 | Status: DISCONTINUED | OUTPATIENT
Start: 2017-07-22 | End: 2017-07-26

## 2017-07-22 RX ADMIN — MORPHINE SULFATE 2 MILLIGRAM(S): 50 CAPSULE, EXTENDED RELEASE ORAL at 12:45

## 2017-07-22 RX ADMIN — MORPHINE SULFATE 2 MILLIGRAM(S): 50 CAPSULE, EXTENDED RELEASE ORAL at 17:41

## 2017-07-22 RX ADMIN — MORPHINE SULFATE 2 MILLIGRAM(S): 50 CAPSULE, EXTENDED RELEASE ORAL at 14:00

## 2017-07-22 RX ADMIN — MORPHINE SULFATE 2 MILLIGRAM(S): 50 CAPSULE, EXTENDED RELEASE ORAL at 17:50

## 2017-07-22 RX ADMIN — MORPHINE SULFATE 2 MILLIGRAM(S): 50 CAPSULE, EXTENDED RELEASE ORAL at 23:46

## 2017-07-22 NOTE — PROGRESS NOTE ADULT - ASSESSMENT
A/P: 82F PMH Advanced Alzheimer's dementia (nonverbal, 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  1. Neuro: encephalopathy likely related to sepsis, hold off on antiepileptics at this point no evidence of seizure. morphine PRN for pain   2. CV: likely distributive shock, weaned off of pressors currently stable vital. TTE showing critical aortic stenosis, c/w asa 81 qd.   3. Pulm: no active issues, c/w NC O2  4. GI: NPO for now, NG tube in place with tube feeding initiated yesterday  5. Renal/Metabolic: [place] in plac, strict I/Os, monitor BUN/Cr/K/HCO3, replenish potassium and phosphate today  6. ID: Cholecystitis c/w meropenem,  7. Heme: DVT ppx, trend leukocytosis (likely due to sepsis vs. seizure)  8. Endo: no active issues  9. Skin: L heel ulcer, does not appear infected, wound care eval; bilateral acetabular fracture, NWB status per Ortho x 3months, lindsay lift for transfers   Dispo: DNR/DNI 82F PMH Advanced Alzheimer's dementia (nonverbal, fully dependent on ADL's) and aortic stenosis presents with episode of syncope and possible seizure with postictal state that has now resolved, found to have septic shock of unclear etiology, suspect UTI also acute cholecystitis

## 2017-07-22 NOTE — PROGRESS NOTE ADULT - PROBLEM SELECTOR PLAN 3
non surgical  limit movement of lower ext  continue morphine bilateral acetabular and left iliac fx  non surgical  limit movement of lower ext  continue morphine

## 2017-07-22 NOTE — PROGRESS NOTE ADULT - SUBJECTIVE AND OBJECTIVE BOX
Follow up: AS, syncope, shock    HPI:  patient was transferred to the floor for hospice care. She is lethargic but appears comfortable     PAST MEDICAL & SURGICAL HISTORY:  Aortic stenosis  Alzheimer disease  Meniscal injury: s/p arthroscopy      MEDICATIONS  (STANDING):  scopolamine   Patch 1.5 milliGRAM(s) Transdermal every 3 days    MEDICATIONS  (PRN):  acetaminophen  Suppository 650 milliGRAM(s) Rectal every 6 hours PRN For Temp greater than 38 C (100.4 F)  morphine  - Injectable 2 milliGRAM(s) IV Push every 1 hour PRN mild pain or discomfort or dyspnea      Vital Signs Last 24 Hrs  T(C): 37.7 (21 Jul 2017 15:00), Max: 37.7 (21 Jul 2017 15:00)  T(F): 99.8 (21 Jul 2017 15:00), Max: 99.8 (21 Jul 2017 15:00)  HR: 82 (22 Jul 2017 05:00) (74 - 99)  BP: 130/64 (22 Jul 2017 05:00) (95/51 - 136/62)  BP(mean): 90 (22 Jul 2017 05:00) (68 - 94)  RR: 10 (22 Jul 2017 05:00) (9 - 20)  SpO2: 100% (22 Jul 2017 05:00) (92% - 100%)    I&O's Summary    21 Jul 2017 07:01  -  22 Jul 2017 07:00  --------------------------------------------------------  IN: 1330 mL / OUT: 1060 mL / NET: 270 mL        PHYSICAL EXAM:    Constitutional: obtunded  Eyes:   Pupils round, no lesions  ENMT: no exudate or erythema  Pulmonary: Non-labored, breath sounds are clear bilaterally, No wheezing, rales or rhonchi  Cardiovascular: PMI not palpable  RRR soft S1 and absent S2, 3/6 sys murmer of AS, no rubs, gallops or clicks  Gastrointestinal: Bowel Sounds present, soft, nontender.   Lymph: No peripheral edema. No cervical lymphadenopathy.  Neurological: obtunded  Skin: No rashes. Changes of chronic venous stasis. No cyanosis.  Psych:  cannot assess                                8.9    15.6  )-----------( 55       ( 21 Jul 2017 06:03 )             25.3     CBC Full  -  ( 21 Jul 2017 06:03 )  WBC Count : 15.6 K/uL  Hemoglobin : 8.9 g/dL  Hematocrit : 25.3 %  Platelet Count - Automated : 55 K/uL  Mean Cell Volume : 93.0 fl  Mean Cell Hemoglobin : 32.6 pg  Mean Cell Hemoglobin Concentration : 35.0 gm/dL  Auto Neutrophil # : 12.3 K/uL  Auto Lymphocyte # : 2.0 K/uL  Auto Monocyte # : 1.0 K/uL  Auto Eosinophil # : 0.2 K/uL  Auto Basophil # : 0.1 K/uL  Auto Neutrophil % : 81.0 %  Auto Lymphocyte % : 9.0 %  Auto Monocyte % : 4.0 %  Auto Eosinophil % : 1.0 %  Auto Basophil % : 0.7 %    07-21    148<H>  |  109<H>  |  25<H>  ----------------------------<  108<H>  3.3<L>   |  35<H>  |  0.64    Ca    8.0<L>      21 Jul 2017 06:03  Phos  1.0     07-21  Mg     2.3     07-21    TPro  4.8<L>  /  Alb  2.3<L>  /  TBili  0.7  /  DBili  x   /  AST  72<H>  /  ALT  63  /  AlkPhos  78  07-21      < from: 12 Lead ECG (07.18.17 @ 08:26) >    Ventricular Rate 110 BPM    Atrial Rate 110 BPM    P-R Interval 126 ms    QRS Duration 84 ms    Q-T Interval 484 ms    QTC Calculation(Bezet) 655 ms    P Axis 73 degrees    R Axis -4 degrees    T Axis 105 degrees    Diagnosis Line Sinus tachycardia  ST & T wave abnormality, consider lateral ischemia  Left ventricular hypertrophy  Confirmed by PRAKASH DUEÑAS ([id number]) on 7/19/2017 11:42:58 AM    < end of copied text >  < from: TTE Echo Doppler w/o Cont (07.18.17 @ 10:57) >     EXAM:  ECHO TTE W/O CON COMP W/DOPPLR         PROCEDURE DATE:  07/18/2017        INTERPRETATION:  Ordering Physician: DARYL A PERLMAN [id number]    Indication: Aortic stenosis    Study Quality: Technically difficult  A complete echocardiographic study was performed utilizing standard   protocol including spectral and color Doppler in all echocardiographic   windows.    Weight: 66 kg  Blood Pressure: 104/55    MEASUREMENTS  IVS: 1.5cm  PWT: 1.3cm  LA: 2.8cm  AO: 3.0cm  LVIDd: 4.0cm  LVIDs: 2.6cm    LVEF: 65%  RVSP: 66mmHg    FINDINGS  Left Ventricle: Concentric left ventricular hypertrophy. Normal left   ventricular systolic function.  Aortic Valve: Heavily Calcified aortic valve with decreased opening. Mild   aortic insufficiency. Peak transaortic gradient equals 100 mmHg, and mean   transaortic gradient equals 67 mmHg. The aortic valve area by continuity   equation is 0.4 sq cm, consistent with critical aortic stenosis.  Mitral Valve: Mitral annular calcification. Otherwise normal mitral valve.  Tricuspid Valve: Tricuspid valve. Mild tricuspid insufficiency.  Pulmonic Valve: Normal pulmonic valve. Minimal pulmonic insufficiency.  Left Atrium: Enlarged  Right Ventricle: Right ventricular enlargement with borderline right   ventricular systolic function.  Right Atrium: Mildly enlarged  Diastolic Function: Grade 1 diastolic dysfunction.  Pericardium/Pleura: Normal pericardium with no pericardial effusion.   Bilateral pleural effusions.                    PRAKASH BARONE M.D., ATTENDING CARDIOLOGIST  This document has been electronically signed. Jul 19 2017 11:26AM                < end of copied text >  < from: Xray Chest 1 View AP- PORTABLE-Urgent (07.18.17 @ 14:45) >    EXAM:  PORTABLE CHEST URGENT                            PROCEDURE DATE:  07/18/2017          INTERPRETATION:  NG tube placement.    AP chest. Prior 7/18/2017.    Nasogastric tube tip in the stomach. Right internal jugular line   reidentified in position. Mild bibasilar atelectasis. Possible trace left   pleural effusion.    Impression: As above                CHANCE MORGAN M.D., ATTENDING RADIOLOGIST  This document has been electronically signed. Jul 18 2017  2:53PM                < end of copied text >

## 2017-07-22 NOTE — PROGRESS NOTE ADULT - ASSESSMENT
81 y/o F with PMHx of Alzheimer Dementia, severe aortic stenosis, presented to the ED with syncope and hypotension, now with sepsis with multiorgan involvement and hypoperfusion, GB thought to be the source, for potential percutaneous cholecystostomy tube, but now off pressors.  She has now been transferred to the floor for hospice care     -TTE with evidence of critical AS  - off pressors at current time, without perc bart   - Pt with poor overall long term prognosis; Now DNR

## 2017-07-22 NOTE — INPATIENT CERTIFICATION FOR MEDICARE PATIENTS - RISKS OF ADVERSE EVENTS
Concern for worsening infectious process/Concern for delay in diagnosis and treatment/Concern for cardiopulmonary deterioration

## 2017-07-22 NOTE — PROGRESS NOTE ADULT - PROBLEM SELECTOR PLAN 1
Karley Bonds unclear etiology possible uti now on comfort care   continue morphine unclear etiology possible uti acute cholecystitis  now on comfort care   continue morphine

## 2017-07-22 NOTE — PROGRESS NOTE ADULT - ATTENDING COMMENTS
discussed with daughter at bedside at length. emotional support provided   change morphine to around the clock to ensure pt remains comfortable.   will continue to follow

## 2017-07-22 NOTE — PROGRESS NOTE ADULT - SUBJECTIVE AND OBJECTIVE BOX
Patient is a 82y old  Female who presents with a chief complaint of Pt passed out at home (18 Jul 2017 13:52) admitted w sepsis possible uti possible acute cholecystitis now dnr comfort measures  receiving morpine      INTERVAL HPI/OVERNIGHT EVENTS: not responsive appears sedated and comfortable on morphine. has pain with any movement    MEDICATIONS  (STANDING):  scopolamine   Patch 1.5 milliGRAM(s) Transdermal every 3 days    MEDICATIONS  (PRN):  acetaminophen  Suppository 650 milliGRAM(s) Rectal every 6 hours PRN For Temp greater than 38 C (100.4 F)  morphine  - Injectable 2 milliGRAM(s) IV Push every 1 hour PRN mild pain or discomfort or dyspnea      Allergies    No Known Allergies    Intolerances        REVIEW OF SYSTEMS:  CONSTITUTIONAL: sedated non responsive on morphine  EYES: sedated  ENMT:  sedated  NECK:   RESPIRATORY: No cough sedated no apparent dyspnea while on ms  CARDIOVASCULAR: no  leg swelling  GASTROINTESTINAL: sedated  GENITOURINARY: has burns  NEUROLOGICAL: sedated not responsive  SKIN:   edema lower est  MUSCULOSKELETAL: hip pain w any movment  ROS  [ ] remaineder Unable to obtain due to lethargy sedation  REST OF REVIEW Of SYSTEM - [ ] Normal     Height (cm): 162.6 (07-17 @ 23:24)  Weight (kg): 65.8 (07-17 @ 09:38)  BMI (kg/m2): 24.9 (07-17 @ 23:24)  BSA (m2): 1.71 (07-17 @ 23:24)  Vital Signs Last 24 Hrs  T(C): 37.7 (21 Jul 2017 15:00), Max: 37.7 (21 Jul 2017 15:00)  T(F): 99.8 (21 Jul 2017 15:00), Max: 99.8 (21 Jul 2017 15:00)  HR: 82 (22 Jul 2017 05:00) (74 - 99)  BP: 130/64 (22 Jul 2017 05:00) (95/51 - 136/62)  BP(mean): 90 (22 Jul 2017 05:00) (68 - 94)  RR: 10 (22 Jul 2017 05:00) (9 - 20)  SpO2: 100% (22 Jul 2017 05:00) (92% - 100%)  [ ] room air   [ x] 02    PHYSICAL EXAM:  GENERAL:    HEAD:  normal  ENMT: normal  NECK:  normal    NERVOUS SYSTEM:  sedated non responsive on morphine  CHEST/LUNG:   [ x] decreased breath sounds at bases  [ ] wheezing   [ ] rhonchi  [ ] crackles  HEART:  Regular rate and rhythm, systolic murmer  ABDOMEN:  soft,  nondistended, positive bowel sounds   [ ] obese  EXTREMITIES: trace edema small blisters lle mid calf  SKIN: [ ] venous stasis skin changes    LABS:      Ca    8.0        21 Jul 2017 06:03        Hemoglobin A1C, Whole Blood: 5.4 % (07-19 @ 08:50)      CAPILLARY BLOOD GLUCOSE        Cultures          RADIOLOGY & ADDITIONAL TESTS:      Care Discussed with [] Consultants  [ ] Patient  [ ] Family  [X]   /   [ ] Other; RN  DVT prophylaxis [ ] lovenox   [ ] subq heparin  [ ] coumadin  [ ] venodynes [ ] ambulating frequently at how risk for vte and no pharm         or  mechanical prophylaxis required    [ ] other  no injections on comfort care  Advanced directive:    [x ]pt has hcp     [ ] pt declined to assign hcp  Discussed with pt @ bedside Patient is a 82y old  Female who presents with a chief complaint of Pt passed out at home (18 Jul 2017 13:52) admitted w sepsis possible uti possible acute cholecystitis now dnr comfort measures  receiving morpine      INTERVAL HPI/OVERNIGHT EVENTS: not responsive appears sedated and comfortable on morphine. has pain with any movement    MEDICATIONS  (STANDING):  scopolamine   Patch 1.5 milliGRAM(s) Transdermal every 3 days    MEDICATIONS  (PRN):  acetaminophen  Suppository 650 milliGRAM(s) Rectal every 6 hours PRN For Temp greater than 38 C (100.4 F)  morphine  - Injectable 2 milliGRAM(s) IV Push every 1 hour PRN mild pain or discomfort or dyspnea      Allergies    No Known Allergies    Intolerances        REVIEW OF SYSTEMS:  CONSTITUTIONAL: sedated non responsive on morphine  EYES: sedated  ENMT:  sedated  NECK:   RESPIRATORY: No cough sedated no apparent dyspnea while on ms  CARDIOVASCULAR: no  leg swelling  GASTROINTESTINAL: sedated  GENITOURINARY: has burns  NEUROLOGICAL: sedated not responsive  SKIN:   edema lower est  MUSCULOSKELETAL: hip pain w any movment  ROS  [ ] remaineder Unable to obtain due to lethargy sedation  REST OF REVIEW Of SYSTEM - [ ] Normal     Height (cm): 162.6 (07-17 @ 23:24)  Weight (kg): 65.8 (07-17 @ 09:38)  BMI (kg/m2): 24.9 (07-17 @ 23:24)  BSA (m2): 1.71 (07-17 @ 23:24)  Vital Signs Last 24 Hrs  T(C): 37.7 (21 Jul 2017 15:00), Max: 37.7 (21 Jul 2017 15:00)  T(F): 99.8 (21 Jul 2017 15:00), Max: 99.8 (21 Jul 2017 15:00)  HR: 82 (22 Jul 2017 05:00) (74 - 99)  BP: 130/64 (22 Jul 2017 05:00) (95/51 - 136/62)  BP(mean): 90 (22 Jul 2017 05:00) (68 - 94)  RR: 10 (22 Jul 2017 05:00) (9 - 20)  SpO2: 100% (22 Jul 2017 05:00) (92% - 100%)  [ ] room air   [ x] 02    PHYSICAL EXAM:  GENERAL:    HEAD:  normal  ENMT: normal  NECK:  normal    NERVOUS SYSTEM:  sedated non responsive on morphine  CHEST/LUNG:   [ x] decreased breath sounds at bases  [ ] wheezing   [ ] rhonchi  [ ] crackles  HEART:  Regular rate and rhythm, systolic murmer  ABDOMEN:  soft,  nondistended, positive bowel sounds   [ ] obese  EXTREMITIES: trace edema small blisters lle mid calf left heel ulcer  SKIN: [ ] venous stasis skin changes    LABS:      Ca    8.0        21 Jul 2017 06:03        Hemoglobin A1C, Whole Blood: 5.4 % (07-19 @ 08:50)      CAPILLARY BLOOD GLUCOSE        Cultures          RADIOLOGY & ADDITIONAL TESTS:      Care Discussed with [] Consultants  [ ] Patient  [ ] Family  [X]   /   [ ] Other; RN  DVT prophylaxis [ ] lovenox   [ ] subq heparin  [ ] coumadin  [ ] venodynes [ ] ambulating frequently at how risk for vte and no pharm         or  mechanical prophylaxis required    [ ] other  no injections on comfort care  Advanced directive:    [x ]pt has hcp     [ ] pt declined to assign hcp  Discussed with pt @ bedside

## 2017-07-23 VITALS
HEART RATE: 65 BPM | RESPIRATION RATE: 9 BRPM | OXYGEN SATURATION: 100 % | DIASTOLIC BLOOD PRESSURE: 76 MMHG | SYSTOLIC BLOOD PRESSURE: 113 MMHG | TEMPERATURE: 99 F

## 2017-07-23 PROCEDURE — 99232 SBSQ HOSP IP/OBS MODERATE 35: CPT

## 2017-07-23 RX ADMIN — MORPHINE SULFATE 2 MILLIGRAM(S): 50 CAPSULE, EXTENDED RELEASE ORAL at 00:06

## 2017-07-23 RX ADMIN — MORPHINE SULFATE 2 MILLIGRAM(S): 50 CAPSULE, EXTENDED RELEASE ORAL at 05:21

## 2017-07-23 RX ADMIN — MORPHINE SULFATE 2 MILLIGRAM(S): 50 CAPSULE, EXTENDED RELEASE ORAL at 18:08

## 2017-07-23 RX ADMIN — MORPHINE SULFATE 2 MILLIGRAM(S): 50 CAPSULE, EXTENDED RELEASE ORAL at 17:37

## 2017-07-23 RX ADMIN — MORPHINE SULFATE 2 MILLIGRAM(S): 50 CAPSULE, EXTENDED RELEASE ORAL at 11:57

## 2017-07-23 RX ADMIN — MORPHINE SULFATE 2 MILLIGRAM(S): 50 CAPSULE, EXTENDED RELEASE ORAL at 12:15

## 2017-07-23 RX ADMIN — MORPHINE SULFATE 2 MILLIGRAM(S): 50 CAPSULE, EXTENDED RELEASE ORAL at 05:06

## 2017-07-23 NOTE — PROGRESS NOTE ADULT - SUBJECTIVE AND OBJECTIVE BOX
Pt. is seen with resident staff.    WBC trending lower.  H/H trending lower likely secondary to pelvis fracture.  Repeat until it stabilizes    Repeat xrays ordered for pelvis to evaluate for protrusion.      Pain control    NWB -b/l Lower extremity.    Decubitus prevention protocol    No sign of compartment syndrome.

## 2017-07-23 NOTE — PROGRESS NOTE ADULT - PROBLEM SELECTOR PLAN 3
bilateral acetabular and left iliac fx  non surgical  limit movement of lower ext  continue morphine

## 2017-07-23 NOTE — PROGRESS NOTE ADULT - SUBJECTIVE AND OBJECTIVE BOX
Patient is a 82y old  Female who presents with a chief complaint of Pt passed out at home (18 Jul 2017 13:52)      INTERVAL HPI/OVERNIGHT EVENTS: Patient seen and examined. NAD.      Vital Signs Last 24 Hrs  T(C): 37.2 (23 Jul 2017 05:15), Max: 37.2 (23 Jul 2017 05:15)  T(F): 99 (23 Jul 2017 05:15), Max: 99 (23 Jul 2017 05:15)  HR: 65 (23 Jul 2017 05:15) (65 - 65)  BP: 113/76 (23 Jul 2017 05:15) (113/76 - 113/76)  BP(mean): --  RR: 9 (23 Jul 2017 05:15) (9 - 9)  SpO2: 100% (23 Jul 2017 05:15) (100% - 100%)I&O's Summary    22 Jul 2017 07:01  -  23 Jul 2017 07:00  --------------------------------------------------------  IN: 0 mL / OUT: 650 mL / NET: -650 mL        LABS:              CAPILLARY BLOOD GLUCOSE              acetaminophen  Suppository 650 milliGRAM(s) Rectal every 6 hours PRN  morphine  - Injectable 2 milliGRAM(s) IV Push every 1 hour PRN  scopolamine   Patch 1.5 milliGRAM(s) Transdermal every 3 days  morphine  - Injectable 2 milliGRAM(s) IV Push every 6 hours      REVIEW OF SYSTEMS: unobtainable due to lethargy      Consultant(s) Notes Reviewed:  [ x] YES  [ ] NO    PHYSICAL EXAM:  GENERAL: NAD, ill-appearing  HEAD:  Atraumatic, Normocephalic  NERVOUS SYSTEM:  lethargic  CHEST/LUNG: decreased BS b/l  HEART: Regular rate and rhythm  ABDOMEN: Soft,     Advanced care planning discussed with patient/family [x] YES   [ ] NO

## 2017-07-23 NOTE — PROGRESS NOTE ADULT - ASSESSMENT
82F PMH Advanced Alzheimer's dementia (nonverbal, fully dependent on ADL's) and aortic stenosis presents with episode of syncope and possible seizure with postictal state that has now resolved, found to have septic shock of unclear etiology, suspect UTI also acute cholecystitis

## 2017-07-24 PROCEDURE — 99232 SBSQ HOSP IP/OBS MODERATE 35: CPT

## 2017-07-24 RX ADMIN — MORPHINE SULFATE 2 MILLIGRAM(S): 50 CAPSULE, EXTENDED RELEASE ORAL at 10:15

## 2017-07-24 RX ADMIN — MORPHINE SULFATE 2 MILLIGRAM(S): 50 CAPSULE, EXTENDED RELEASE ORAL at 05:55

## 2017-07-24 RX ADMIN — MORPHINE SULFATE 2 MILLIGRAM(S): 50 CAPSULE, EXTENDED RELEASE ORAL at 20:49

## 2017-07-24 RX ADMIN — MORPHINE SULFATE 2 MILLIGRAM(S): 50 CAPSULE, EXTENDED RELEASE ORAL at 00:07

## 2017-07-24 RX ADMIN — MORPHINE SULFATE 2 MILLIGRAM(S): 50 CAPSULE, EXTENDED RELEASE ORAL at 16:21

## 2017-07-24 RX ADMIN — MORPHINE SULFATE 2 MILLIGRAM(S): 50 CAPSULE, EXTENDED RELEASE ORAL at 00:22

## 2017-07-24 RX ADMIN — MORPHINE SULFATE 2 MILLIGRAM(S): 50 CAPSULE, EXTENDED RELEASE ORAL at 20:43

## 2017-07-24 RX ADMIN — MORPHINE SULFATE 2 MILLIGRAM(S): 50 CAPSULE, EXTENDED RELEASE ORAL at 05:40

## 2017-07-24 RX ADMIN — MORPHINE SULFATE 2 MILLIGRAM(S): 50 CAPSULE, EXTENDED RELEASE ORAL at 09:40

## 2017-07-24 RX ADMIN — MORPHINE SULFATE 2 MILLIGRAM(S): 50 CAPSULE, EXTENDED RELEASE ORAL at 16:41

## 2017-07-24 NOTE — PROGRESS NOTE ADULT - SUBJECTIVE AND OBJECTIVE BOX
Neurology follow up note    SUNIL [id number]      Interval History:    Patient seen with family S/P morphine     MEDICATIONS    acetaminophen  Suppository 650 milliGRAM(s) Rectal every 6 hours PRN  morphine  - Injectable 2 milliGRAM(s) IV Push every 1 hour PRN  scopolamine   Patch 1.5 milliGRAM(s) Transdermal every 3 days  morphine  - Injectable 2 milliGRAM(s) IV Push every 6 hours      Allergies    No Known Allergies    Intolerances            Vital Signs Last 24 Hrs  T(C): --  T(F): --  HR: --  BP: --  BP(mean): --  RR: --  SpO2: --      REVIEW OF SYSTEMS: Non Verbal     On Neurological Examination:    Mental Status -  Unresponsive  .     Does not follow commands    Speech -    Non Verbal                         not examined on comfort care                   LABS:            Hemoglobin A1C:       Vitamin B12         RADIOLOGY    ANALYSIS AND PLAN:  An 82-year-old with episode of unresponsiveness and history of dementia.    1.	For episode of unresponsiveness h/o of septic shock type of component.  2.	Spoke with  and daughter at bedside----- on comfort care will sign off

## 2017-07-24 NOTE — PROGRESS NOTE ADULT - SUBJECTIVE AND OBJECTIVE BOX
[facility] - Maisha Sosa, Lino, Dante, Indio Silverio  Office Number:  [phone]    Patient resting comfortably in bed in NAD.       MEDICATIONS  (STANDING):  scopolamine   Patch 1.5 milliGRAM(s) Transdermal every 3 days  morphine  - Injectable 2 milliGRAM(s) IV Push every 6 hours    MEDICATIONS  (PRN):  acetaminophen  Suppository 650 milliGRAM(s) Rectal every 6 hours PRN For Temp greater than 38 C (100.4 F)  morphine  - Injectable 2 milliGRAM(s) IV Push every 1 hour PRN mild pain or discomfort or dyspnea      Allergies    No Known Allergies      ON EXAM:    General: NAD  HEENT: Mucous membranes are dry, anicteric  Lungs: Non-labored, breath sounds are clear bilaterally, No wheezing, rales or rhonchi  Cardiovascular: Regular, S1 and S2, 3/6 systolic murmur.    Gastrointestinal: Bowel Sounds present, soft, nontender.   Lymph: No peripheral edema. No lymphadenopathy.  Skin: No rashes or ulcers

## 2017-07-24 NOTE — PROVIDER CONTACT NOTE (CHANGE IN STATUS NOTIFICATION) - ASSESSMENT
Patient is an 83 yo bedbound female receiving comfort care family refused assessment of heel pressure injuries and care

## 2017-07-24 NOTE — PROGRESS NOTE ADULT - ATTENDING COMMENTS
pt seen w housestaff and case discussed.  dw family as well- comfort measures.  pt has severe pain w any movement- will remain as inpatient, comfort measures only.  prognosis grave

## 2017-07-24 NOTE — PROGRESS NOTE ADULT - SUBJECTIVE AND OBJECTIVE BOX
Pt S+E at bedside. Spoke with Pt's  and son about repeat x-rays of pelvis to evaluate protrusion. Family does not want the x-rays performed. Pt comfort care only.    PAST MEDICAL & SURGICAL HISTORY:  Aortic stenosis  Alzheimer disease  Meniscal injury: s/p arthroscopy    MEDICATIONS  (STANDING):  scopolamine   Patch 1.5 milliGRAM(s) Transdermal every 3 days  morphine  - Injectable 2 milliGRAM(s) IV Push every 6 hours    Allergies    No Known Allergies    Intolerances    Physical Exam:  Gen: NAD, ill-appearing  LLE: Skin intact, non-erythematous, trace edema, few small blisters on anterior shin, + heel ulcer, unable to assess motor/sensation 2/2 mental status, +dp pulse, warm, well perfused foot, cap refill brisk, compartments soft, compressible.

## 2017-07-24 NOTE — PROGRESS NOTE ADULT - ASSESSMENT
82F [facility] Advanced Alzheimer's dementia (nonverbal, fully dependent on ADL's) and aortic stenosis presents with episode of syncope and possible seizure with postictal state that has now resolved, found to have septic shock of unclear etiology, suspect UTI also acute cholecystitis. Patient is DNR/DNI on comfort care

## 2017-07-24 NOTE — PROGRESS NOTE ADULT - ASSESSMENT
A/P: 82F with B/L acetabular fractures  Analgesia, comfort care  DVT ppx held, comfort care only  NWB B/l LE   No acute signs of compartment syndrome LLE  Family refused repeat pelvis x-rays to evaluate protrusio  Continue medical management, comfort care  Discussed with Dr. Cardoso and will advise if plan changes

## 2017-07-24 NOTE — PROGRESS NOTE ADULT - SUBJECTIVE AND OBJECTIVE BOX
Patient is a 82y old  Female who presents with a chief complaint of Pt passed out at home (18 Jul 2017 13:52)      INTERVAL HPI/OVERNIGHT EVENTS: Patient is DNR/DNI on comfort care. Seen and evaluated at bedside, patient is nonresponsive, appears to be resting comfortably, patient's family does not think she's in any pain. Patient on morphine for pain control.     MEDICATIONS  (STANDING):  scopolamine   Patch 1.5 milliGRAM(s) Transdermal every 3 days  morphine  - Injectable 2 milliGRAM(s) IV Push every 6 hours    MEDICATIONS  (PRN):  acetaminophen  Suppository 650 milliGRAM(s) Rectal every 6 hours PRN For Temp greater than 38 C (100.4 F)  morphine  - Injectable 2 milliGRAM(s) IV Push every 1 hour PRN mild pain or discomfort or dyspnea      Allergies    No Known Allergies    Intolerances        REVIEW OF SYSTEMS:  CONSTITUTIONAL: sedated and nonresponsive, on morphine  EYES: sedated  ENMT: sedated  RESPIRATORY: no cough, sedated  CARDIOVASCULAR: sedated  GASTROINTESTINAL: sedated  GENITOURINARY: burns catheter  NEUROLOGICAL: sedated, none responsive  ROS  [ ] Unable to obtain   REST OF REVIEW Of SYSTEM - [ ] Normal     Height (cm): 162.6 (07-17 @ 23:24)  Weight (kg): 65.8 (07-17 @ 09:38)  BMI (kg/m2): 24.9 (07-17 @ 23:24)  BSA (m2): 1.71 (07-17 @ 23:24)  Vital Signs Last 24 Hrs  T(C): --  T(F): --  HR: --  BP: --  BP(mean): --  RR: --  SpO2: --  [ ] room air   [X] 02    PHYSICAL EXAM:  GENERAL:  NAD, sedated  HEAD:  normal  ENMT: normal  NECK:  normal    NERVOUS SYSTEM: [ ]Confusion  [ ] Encephalopathic [X] Sedated on morphine, non responsive [ ] Other  CHEST/LUNG:  [X] decreased breath sounds at bases  [ ] wheezing   [ ] rhonchi  [ ] crackles  HEART:  Regular rate and rhythm, systolic murmur  ABDOMEN:  soft, nondistended, positive bowel sounds   [ ] obese  EXTREMITIES: +edema LLE  SKIN: [ ] venous stasis skin changes    LABS:            Hemoglobin A1C, Whole Blood: 5.4 % (07-19 @ 08:50)      CAPILLARY BLOOD GLUCOSE        Cultures          RADIOLOGY & ADDITIONAL TESTS:      Care Discussed with [X] Consultants  [ ] Patient  [X] Family  [X]   /   [ ] Other; RN  DVT prophylaxis [ ] lovenox   [ ] subq heparin  [ ] coumadin  [ ] venodynes [ ] ambulating frequently at how risk for vte and no pharm         or  mechanical prophylaxis required    [ ] other   Advanced directive:    [X]pt has hcp     [ ] pt declined to assign hcp  Discussed with pt @ bedside

## 2017-07-24 NOTE — PROGRESS NOTE ADULT - ASSESSMENT
83 y/o F with PMHx of Alzheimer Dementia, severe aortic stenosis, presented to the ED with syncope and hypotension, sepsis with multiorgan involvement and hypoperfusion, GB thought to be the source, now comfort measures only:    -TTE with evidence of critical AS  - Pt with poor overall long term prognosis; Now DNR, and comfort measures only.  Will sign off.  No specific cardiac treatment or intervention indicated.

## 2017-07-25 RX ORDER — ATROPINE SULFATE 1 %
2 DROPS OPHTHALMIC (EYE) EVERY 8 HOURS
Qty: 0 | Refills: 0 | Status: DISCONTINUED | OUTPATIENT
Start: 2017-07-25 | End: 2017-07-27

## 2017-07-25 RX ADMIN — MORPHINE SULFATE 2 MILLIGRAM(S): 50 CAPSULE, EXTENDED RELEASE ORAL at 05:28

## 2017-07-25 RX ADMIN — MORPHINE SULFATE 2 MILLIGRAM(S): 50 CAPSULE, EXTENDED RELEASE ORAL at 12:15

## 2017-07-25 RX ADMIN — Medication 0.5 MILLIGRAM(S): at 21:07

## 2017-07-25 RX ADMIN — MORPHINE SULFATE 2 MILLIGRAM(S): 50 CAPSULE, EXTENDED RELEASE ORAL at 17:45

## 2017-07-25 RX ADMIN — MORPHINE SULFATE 2 MILLIGRAM(S): 50 CAPSULE, EXTENDED RELEASE ORAL at 00:26

## 2017-07-25 RX ADMIN — MORPHINE SULFATE 2 MILLIGRAM(S): 50 CAPSULE, EXTENDED RELEASE ORAL at 16:50

## 2017-07-25 RX ADMIN — MORPHINE SULFATE 2 MILLIGRAM(S): 50 CAPSULE, EXTENDED RELEASE ORAL at 20:40

## 2017-07-25 RX ADMIN — MORPHINE SULFATE 2 MILLIGRAM(S): 50 CAPSULE, EXTENDED RELEASE ORAL at 17:05

## 2017-07-25 RX ADMIN — MORPHINE SULFATE 2 MILLIGRAM(S): 50 CAPSULE, EXTENDED RELEASE ORAL at 12:01

## 2017-07-25 RX ADMIN — MORPHINE SULFATE 2 MILLIGRAM(S): 50 CAPSULE, EXTENDED RELEASE ORAL at 17:31

## 2017-07-25 RX ADMIN — SCOPALAMINE 1.5 MILLIGRAM(S): 1 PATCH, EXTENDED RELEASE TRANSDERMAL at 08:01

## 2017-07-25 RX ADMIN — MORPHINE SULFATE 2 MILLIGRAM(S): 50 CAPSULE, EXTENDED RELEASE ORAL at 20:41

## 2017-07-25 RX ADMIN — Medication 2 DROP(S): at 16:54

## 2017-07-25 RX ADMIN — MORPHINE SULFATE 2 MILLIGRAM(S): 50 CAPSULE, EXTENDED RELEASE ORAL at 05:15

## 2017-07-25 NOTE — PROGRESS NOTE ADULT - ATTENDING COMMENTS
pt seen and examined  gasping and groaning at intervals with excess salivary secretions requiring frequent suctioning  ms increased to 2 mg q 4 hr atc, added oral atropine drops sl  dw family at bed side emotional support provided.at length pt seen and examined  gasping and groaning at intervals with excess salivary secretions requiring frequent suctioning  ms to be  increased to 2 mg q 4 hr atc if sx persist added oral atropine drops sl  dw family at bed side emotional support provided.at length

## 2017-07-25 NOTE — PROGRESS NOTE ADULT - ASSESSMENT
82F [facility] Advanced Alzheimer's dementia (nonverbal, fully dependent on ADL's) and aortic stenosis presents with episode of syncope and possible seizure with postictal state that has now resolved, found to have septic shock of unclear etiology, suspect UTI also acute cholecystitis. Patient is DNR/DNI on comfort care

## 2017-07-25 NOTE — PROGRESS NOTE ADULT - SUBJECTIVE AND OBJECTIVE BOX
Patient is a 82y old  Female who presents with a chief complaint of Pt passed out at home (18 Jul 2017 13:52)      INTERVAL HPI/OVERNIGHT EVENTS: Patient is DNR/DNI on comfort care. Seen and evaluated at bedside, patient is nonresponsive, appears to be resting comfortably, patient's family does not think she's in any pain. Patient on morphine for pain control.       MEDICATIONS  (STANDING):  scopolamine   Patch 1.5 milliGRAM(s) Transdermal every 3 days  morphine  - Injectable 2 milliGRAM(s) IV Push every 6 hours    MEDICATIONS  (PRN):  acetaminophen  Suppository 650 milliGRAM(s) Rectal every 6 hours PRN For Temp greater than 38 C (100.4 F)  morphine  - Injectable 2 milliGRAM(s) IV Push every 1 hour PRN mild pain or discomfort or dyspnea      Allergies    No Known Allergies    Intolerances        REVIEW OF SYSTEMS:  CONSTITUTIONAL: sedated and nonresponsive, on morphine  ENMT:  sedated  RESPIRATORY: No cough, sedated  CARDIOVASCULAR: sedated  GASTROINTESTINAL: sedated  GENITOURINARY: burns catheter  NEUROLOGICAL: sedated   ROS  [ ] Unable to obtain   REST OF REVIEW Of SYSTEM - [ ] Normal     Height (cm): 162.6 (07-17 @ 23:24)  Weight (kg): 65.8 (07-17 @ 09:38)  BMI (kg/m2): 24.9 (07-17 @ 23:24)  BSA (m2): 1.71 (07-17 @ 23:24)  Vital Signs Last 24 Hrs  T(C): --  T(F): --  HR: --  BP: --  BP(mean): --  RR: --  SpO2: --  [ ] room air   [X] 02    PHYSICAL EXAM:  GENERAL:  No acute distresss, sedated  HEAD:  normal  ENMT: normal  NECK:  normal    NERVOUS SYSTEM: [ ]Confusion  [ ] Encephalopathic [X] Sedated [ ] Other  CHEST/LUNG: [X] decreased breath sounds at bases  [ ] wheezing   [ ] rhonchi  [ ] crackles  HEART:  Regular rate and rhythm  ABDOMEN:  soft, non distended  SKIN: [ ] venous stasis skin changes    LABS:            Hemoglobin A1C, Whole Blood: 5.4 % (07-19 @ 08:50)      CAPILLARY BLOOD GLUCOSE        Cultures          RADIOLOGY & ADDITIONAL TESTS:      Care Discussed with [X] Consultants  [ ] Patient  [X] Family  [X]   /   [X] Other; RN  DVT prophylaxis [ ] lovenox   [ ] subq heparin  [ ] coumadin  [ ] venodynes [ ] ambulating frequently at how risk for vte and no pharm         or  mechanical prophylaxis required    [ ] other   Advanced directive:    [X]pt has hcp     [ ] pt declined to assign hcp  Discussed with pt @ bedside

## 2017-07-26 RX ORDER — MORPHINE SULFATE 50 MG/1
1 CAPSULE, EXTENDED RELEASE ORAL
Qty: 100 | Refills: 0 | Status: DISCONTINUED | OUTPATIENT
Start: 2017-07-26 | End: 2017-07-27

## 2017-07-26 RX ORDER — SODIUM CHLORIDE 9 MG/ML
1000 INJECTION INTRAMUSCULAR; INTRAVENOUS; SUBCUTANEOUS
Qty: 0 | Refills: 0 | Status: DISCONTINUED | OUTPATIENT
Start: 2017-07-26 | End: 2017-07-27

## 2017-07-26 RX ADMIN — MORPHINE SULFATE 2 MILLIGRAM(S): 50 CAPSULE, EXTENDED RELEASE ORAL at 00:13

## 2017-07-26 RX ADMIN — MORPHINE SULFATE 2 MILLIGRAM(S): 50 CAPSULE, EXTENDED RELEASE ORAL at 05:45

## 2017-07-26 RX ADMIN — Medication 2 DROP(S): at 05:34

## 2017-07-26 RX ADMIN — SODIUM CHLORIDE 20 MILLILITER(S): 9 INJECTION INTRAMUSCULAR; INTRAVENOUS; SUBCUTANEOUS at 17:49

## 2017-07-26 RX ADMIN — MORPHINE SULFATE 1 MG/HR: 50 CAPSULE, EXTENDED RELEASE ORAL at 17:48

## 2017-07-26 RX ADMIN — Medication 0.5 MILLIGRAM(S): at 20:56

## 2017-07-26 RX ADMIN — MORPHINE SULFATE 1 MG/HR: 50 CAPSULE, EXTENDED RELEASE ORAL at 19:14

## 2017-07-26 RX ADMIN — MORPHINE SULFATE 1 MG/HR: 50 CAPSULE, EXTENDED RELEASE ORAL at 16:26

## 2017-07-26 RX ADMIN — MORPHINE SULFATE 2 MILLIGRAM(S): 50 CAPSULE, EXTENDED RELEASE ORAL at 05:34

## 2017-07-26 RX ADMIN — MORPHINE SULFATE 2 MILLIGRAM(S): 50 CAPSULE, EXTENDED RELEASE ORAL at 12:01

## 2017-07-26 RX ADMIN — MORPHINE SULFATE 1 MG/HR: 50 CAPSULE, EXTENDED RELEASE ORAL at 20:09

## 2017-07-26 RX ADMIN — Medication 0.5 MILLIGRAM(S): at 14:46

## 2017-07-26 RX ADMIN — Medication 2 DROP(S): at 20:56

## 2017-07-26 NOTE — PROGRESS NOTE ADULT - SUBJECTIVE AND OBJECTIVE BOX
Patient is a 82y old  Female who presents with a chief complaint of Pt passed out at home (18 Jul 2017 13:52)      INTERVAL HPI/OVERNIGHT EVENTS: Patient is DNR/DNI on comfort care. Seen and evaluated at bedside, patient is nonresponsive, appears to be resting comfortably, patient's family does not think she's in any pain. Patient on morphine for pain control. Scopolamine patch for secretion, given atropine sublingual drop for excessive secretion.       MEDICATIONS  (STANDING):  scopolamine   Patch 1.5 milliGRAM(s) Transdermal every 3 days  morphine  - Injectable 2 milliGRAM(s) IV Push every 6 hours  atropine 1% sublingual - 2 drops every 8 hours, PRN   Lorazepam injectable - 0.5mg IV push every 6 hours, PRN    MEDICATIONS  (PRN):  acetaminophen  Suppository 650 milliGRAM(s) Rectal every 6 hours PRN For Temp greater than 38 C (100.4 F)  morphine  - Injectable 2 milliGRAM(s) IV Push every 1 hour PRN mild pain or discomfort or dyspnea  atropine 1% sublingual - 2 drops every 8 hours, PRN   Lorazepam injectable - 0.5mg IV push every 6 hours, PRN    Allergies    No Known Allergies    Intolerances        REVIEW OF SYSTEMS:  CONSTITUTIONAL: sedated and nonresponsive, on morphine  ENMT: sedated  RESPIRATORY: No cough, sedated  CARDIOVASCULAR: sedated  GASTROINTESTINAL: sedated  GENITOURINARY: burns catheter  NEUROLOGICAL: sedated   ROS  [ ] Unable to obtain   REST OF REVIEW Of SYSTEM - [ ] Normal     Height (cm): 162.6 (07-17 @ 23:24)  Weight (kg): 65.8 (07-17 @ 09:38)  BMI (kg/m2): 24.9 (07-17 @ 23:24)  BSA (m2): 1.71 (07-17 @ 23:24)  Vital Signs Last 24 Hrs  T(C): --  T(F): --  HR: --  BP: --  BP(mean): --  RR: --  SpO2: --  [ ] room air   [X] 02    PHYSICAL EXAM:  GENERAL:  No acute distresss, sedated  HEAD:  normal  ENMT: normal  NECK:  normal    NERVOUS SYSTEM: [ ]Confusion  [ ] Encephalopathic [X] Sedated [ ] Other  CHEST/LUNG: [X] decreased breath sounds at bases  [ ] wheezing   [ ] rhonchi  [ ] crackles  HEART:  Regular rate and rhythm  ABDOMEN:  soft, non distended  SKIN: [ ] venous stasis skin changes    LABS:            Hemoglobin A1C, Whole Blood: 5.4 % (07-19 @ 08:50)      CAPILLARY BLOOD GLUCOSE        Cultures          RADIOLOGY & ADDITIONAL TESTS:      Care Discussed with [X] Consultants  [ ] Patient  [X] Family  [X]   /   [X] Other; RN  DVT prophylaxis [ ] lovenox   [ ] subq heparin  [ ] coumadin  [ ] venodynes [ ] ambulating frequently at how risk for vte and no pharm         or  mechanical prophylaxis required    [ ] other   Advanced directive:    [X]pt has hcp     [ ] pt declined to assign hcp  Discussed with pt @ bedside Patient is a 82y old  Female who presents with a chief complaint of Pt passed out at home (18 Jul 2017 13:52)      INTERVAL HPI/OVERNIGHT EVENTS: Patient is DNR/DNI on comfort care. Seen and evaluated at bedside, patient is nonresponsive, appears to be resting comfortably, patient's family does not think she's in any pain. Patient on morphine for pain control. Scopolamine patch for secretion, given atropine sublingual drop for excessive secretion.       MEDICATIONS  (STANDING):  scopolamine   Patch 1.5 milliGRAM(s) Transdermal every 3 days  morphine  - Injectable 2 milliGRAM(s) IV Push every 6 hours  atropine 1% sublingual - 2 drops every 8 hours, PRN   Lorazepam injectable - 0.5mg IV push every 6 hours, PRN    MEDICATIONS  (PRN):  acetaminophen  Suppository 650 milliGRAM(s) Rectal every 6 hours PRN For Temp greater than 38 C (100.4 F)  morphine  - Injectable 2 milliGRAM(s) IV Push every 1 hour PRN mild pain or discomfort or dyspnea  atropine 1% sublingual - 2 drops every 8 hours, PRN   Lorazepam injectable - 0.5mg IV push every 6 hours, PRN    Allergies    No Known Allergies    Intolerances        REVIEW OF SYSTEMS:  CONSTITUTIONAL: sedated and nonresponsive, on morphine  ENMT: sedated  RESPIRATORY: No cough, sedated  CARDIOVASCULAR: sedated  GASTROINTESTINAL: sedated  GENITOURINARY: burns catheter  NEUROLOGICAL: sedated   ROS  [ ] Unable to obtain   REST OF REVIEW Of SYSTEM - [ ] Normal     Height (cm): 162.6 (07-17 @ 23:24)  Weight (kg): 65.8 (07-17 @ 09:38)  BMI (kg/m2): 24.9 (07-17 @ 23:24)  BSA (m2): 1.71 (07-17 @ 23:24)  Vital Signs Last 24 Hrs  T(C): -- family refusing vital  T(F): --  HR: --  BP: --  BP(mean): --  RR: --  SpO2: --  [ ] room air   [x] 02    PHYSICAL EXAM:  GENERAL:  No acute distresss, sedated  HEAD:  normal  ENMT: normal  NECK:  normal    NERVOUS SYSTEM: [ ]Confusion  [ ] Encephalopathic [X] Sedated [ ] Other  CHEST/LUNG: [X] decreased breath sounds at bases  [ ] wheezing   [ ] rhonchi  [ ] crackles  HEART:  Regular rate and rhythm  ABDOMEN:  soft, non distended  SKIN: [ ] venous stasis skin changes    LABS:            Hemoglobin A1C, Whole Blood: 5.4 % (07-19 @ 08:50)      CAPILLARY BLOOD GLUCOSE        Cultures          RADIOLOGY & ADDITIONAL TESTS:      Care Discussed with [X] Consultants  [ ] Patient  [X] Family  [X]   /   [X] Other; RN  DVT prophylaxis [ ] lovenox   [ ] subq heparin  [ ] coumadin  [ ] venodynes [ ] ambulating frequently at how risk for vte and no pharm         or  mechanical prophylaxis required    [ ] other   Advanced directive:    [X]pt has hcp     [ ] pt declined to assign hcp  Discussed with pt @ bedside Patient is a 82y old  Female who presents with a chief complaint of Pt passed out at home (18 Jul 2017 13:52)      INTERVAL HPI/OVERNIGHT EVENTS: Patient is DNR/DNI on comfort care. Seen and evaluated at bedside, patient is nonresponsive, appears to be resting comfortably, patient's family does not think she's in any pain. Patient on morphine for pain control. Scopolamine patch for secretion, given atropine sublingual drop for excessive secretion. still gasping and moaning at frequent intervals despite increase in morphine to q 4 hours.  family asking for increase in ativan (will change to around the clock) and wish to discuss morphine drip as alternative to ivp dosing being given. palliative called to meet with them further.      MEDICATIONS  (STANDING):  scopolamine   Patch 1.5 milliGRAM(s) Transdermal every 3 days  morphine  - Injectable 2 milliGRAM(s) IV Push every 6 hours  atropine 1% sublingual - 2 drops every 8 hours, PRN   Lorazepam injectable - 0.5mg IV push every 6 hours, PRN    MEDICATIONS  (PRN):  acetaminophen  Suppository 650 milliGRAM(s) Rectal every 6 hours PRN For Temp greater than 38 C (100.4 F)  morphine  - Injectable 2 milliGRAM(s) IV Push every 1 hour PRN mild pain or discomfort or dyspnea  atropine 1% sublingual - 2 drops every 8 hours, PRN   Lorazepam injectable - 0.5mg IV push every 6 hours, PRN    Allergies    No Known Allergies    Intolerances        REVIEW OF SYSTEMS:  CONSTITUTIONAL: sedated and nonresponsive, on morphine  ENMT: sedated  RESPIRATORY: No cough, sedated  CARDIOVASCULAR: sedated  GASTROINTESTINAL: sedated  GENITOURINARY: burns catheter  NEUROLOGICAL: sedated   ROS  [ ] Unable to obtain   REST OF REVIEW Of SYSTEM - [ ] Normal     Height (cm): 162.6 (07-17 @ 23:24)  Weight (kg): 65.8 (07-17 @ 09:38)  BMI (kg/m2): 24.9 (07-17 @ 23:24)  BSA (m2): 1.71 (07-17 @ 23:24)  Vital Signs Last 24 Hrs  T(C): -- family refusing vital  T(F): --  HR: --  BP: --  BP(mean): --  RR: --  SpO2: --  [ ] room air   [x] 02    PHYSICAL EXAM:  GENERAL:  No acute distresss, sedated  HEAD:  normal  ENMT: normal  NECK:  normal    NERVOUS SYSTEM: [ ]Confusion  [ ] Encephalopathic [X] Sedated [ ] Other  CHEST/LUNG: [X] decreased breath sounds at bases  [ ] wheezing   [ ] rhonchi  [ ] crackles  HEART:  Regular rate and rhythm  ABDOMEN:  soft, non distended  SKIN: [ ] venous stasis skin changes    LABS:            Hemoglobin A1C, Whole Blood: 5.4 % (07-19 @ 08:50)      CAPILLARY BLOOD GLUCOSE        Cultures          RADIOLOGY & ADDITIONAL TESTS:      Care Discussed with [X] Consultants  [ ] Patient  [X] Family  [X]   /   [X] Other; RN  DVT prophylaxis [ ] lovenox   [ ] subq heparin  [ ] coumadin  [ ] venodynes [ ] ambulating frequently at how risk for vte and no pharm         or  mechanical prophylaxis required    [ ] other   Advanced directive:    [X]pt has hcp     [ ] pt declined to assign hcp  Discussed with pt @ bedside Patient is a 82y old  Female who presents with a chief complaint of Pt passed out at home (18 Jul 2017 13:52)      INTERVAL HPI/OVERNIGHT EVENTS: Patient is DNR/DNI on comfort care. Seen and evaluated at bedside, patient is nonresponsive, appears to be resting comfortably, patient's family does not think she's in any pain. Patient on morphine for pain control. Scopolamine patch for secretion, given atropine sublingual drop for excessive secretion. still gasping and moaning at frequent intervals despite  morphine q 6 hours.  family asking for increase in ativan (will change to around the clock) and wish to discuss morphine drip as alternative to ivp dosing being given. palliative called to meet with them further.      MEDICATIONS  (STANDING):  scopolamine   Patch 1.5 milliGRAM(s) Transdermal every 3 days  morphine  - Injectable 2 milliGRAM(s) IV Push every 6 hours  atropine 1% sublingual - 2 drops every 8 hours, PRN   Lorazepam injectable - 0.5mg IV push every 6 hours, PRN    MEDICATIONS  (PRN):  acetaminophen  Suppository 650 milliGRAM(s) Rectal every 6 hours PRN For Temp greater than 38 C (100.4 F)  morphine  - Injectable 2 milliGRAM(s) IV Push every 1 hour PRN mild pain or discomfort or dyspnea  atropine 1% sublingual - 2 drops every 8 hours, PRN   Lorazepam injectable - 0.5mg IV push every 6 hours, PRN    Allergies    No Known Allergies    Intolerances        REVIEW OF SYSTEMS:  CONSTITUTIONAL: sedated and nonresponsive, on morphine  ENMT: sedated  RESPIRATORY: No cough, sedated  CARDIOVASCULAR: sedated  GASTROINTESTINAL: sedated  GENITOURINARY: burns catheter  NEUROLOGICAL: sedated   ROS  [ ] Unable to obtain   REST OF REVIEW Of SYSTEM - [ ] Normal     Height (cm): 162.6 (07-17 @ 23:24)  Weight (kg): 65.8 (07-17 @ 09:38)  BMI (kg/m2): 24.9 (07-17 @ 23:24)  BSA (m2): 1.71 (07-17 @ 23:24)  Vital Signs Last 24 Hrs  T(C): -- family refusing vital  T(F): --  HR: --  BP: --  BP(mean): --  RR: --  SpO2: --  [ ] room air   [x] 02    PHYSICAL EXAM:  GENERAL:  No acute distresss, sedated  HEAD:  normal  ENMT: normal  NECK:  normal    NERVOUS SYSTEM: [ ]Confusion  [ ] Encephalopathic [X] Sedated [ ] Other  CHEST/LUNG: [X] decreased breath sounds at bases  [ ] wheezing   [ ] rhonchi  [ ] crackles  HEART:  Regular rate and rhythm  ABDOMEN:  soft, non distended  SKIN: [ ] venous stasis skin changes    LABS:            Hemoglobin A1C, Whole Blood: 5.4 % (07-19 @ 08:50)      CAPILLARY BLOOD GLUCOSE        Cultures          RADIOLOGY & ADDITIONAL TESTS:      Care Discussed with [X] Consultants  [ ] Patient  [X] Family  [X]   /   [X] Other; RN  DVT prophylaxis [ ] lovenox   [ ] subq heparin  [ ] coumadin  [ ] venodynes [ ] ambulating frequently at how risk for vte and no pharm         or  mechanical prophylaxis required    [ ] other   Advanced directive:    [X]pt has hcp     [ ] pt declined to assign hcp  Discussed with pt @ bedside Patient is a 82y old  Female who presents with a chief complaint of Pt passed out at home (18 Jul 2017 13:52)      INTERVAL HPI/OVERNIGHT EVENTS: Patient is DNR/DNI on comfort care. Seen and evaluated at bedside, patient is nonresponsive, appears to be resting comfortably, patient's family does not think she's in any pain. Patient on morphine for pain control. Scopolamine patch for secretion, given atropine sublingual drop for excessive secretion. still gasping and moaning at frequent intervals despite  morphine q 6 hours with morphine prn breakthrough.  family asking for increase in ativan (will change to around the clock) and wish to discuss morphine drip as alternative to ivp dosing being given. palliative called to meet with them further.      MEDICATIONS  (STANDING):  scopolamine   Patch 1.5 milliGRAM(s) Transdermal every 3 days  morphine  - Injectable 2 milliGRAM(s) IV Push every 6 hours  atropine 1% sublingual - 2 drops every 8 hours, PRN   Lorazepam injectable - 0.5mg IV push every 6 hours, PRN    MEDICATIONS  (PRN):  acetaminophen  Suppository 650 milliGRAM(s) Rectal every 6 hours PRN For Temp greater than 38 C (100.4 F)  morphine  - Injectable 2 milliGRAM(s) IV Push every 1 hour PRN mild pain or discomfort or dyspnea  atropine 1% sublingual - 2 drops every 8 hours, PRN   Lorazepam injectable - 0.5mg IV push every 6 hours, PRN    Allergies    No Known Allergies    Intolerances        REVIEW OF SYSTEMS:  CONSTITUTIONAL: sedated and nonresponsive, on morphine  ENMT: sedated  RESPIRATORY: No cough, sedated  CARDIOVASCULAR: sedated  GASTROINTESTINAL: sedated  GENITOURINARY: burns catheter  NEUROLOGICAL: sedated   ROS  [ ] Unable to obtain   REST OF REVIEW Of SYSTEM - [ ] Normal     Height (cm): 162.6 (07-17 @ 23:24)  Weight (kg): 65.8 (07-17 @ 09:38)  BMI (kg/m2): 24.9 (07-17 @ 23:24)  BSA (m2): 1.71 (07-17 @ 23:24)  Vital Signs Last 24 Hrs  T(C): -- family refusing vital  T(F): --  HR: --  BP: --  BP(mean): --  RR: --  SpO2: --  [ ] room air   [x] 02    PHYSICAL EXAM:  GENERAL:  No acute distresss, sedated  HEAD:  normal  ENMT: normal  NECK:  normal    NERVOUS SYSTEM: [ ]Confusion  [ ] Encephalopathic [X] Sedated [ ] Other  CHEST/LUNG: [X] decreased breath sounds at bases  [ ] wheezing   [ ] rhonchi  [ ] crackles  HEART:  Regular rate and rhythm  ABDOMEN:  soft, non distended  SKIN: [ ] venous stasis skin changes    LABS:            Hemoglobin A1C, Whole Blood: 5.4 % (07-19 @ 08:50)      CAPILLARY BLOOD GLUCOSE        Cultures          RADIOLOGY & ADDITIONAL TESTS:      Care Discussed with [X] Consultants  [ ] Patient  [X] Family  [X]   /   [X] Other; RN  DVT prophylaxis [ ] lovenox   [ ] subq heparin  [ ] coumadin  [ ] venodynes [ ] ambulating frequently at how risk for vte and no pharm         or  mechanical prophylaxis required    [ ] other   Advanced directive:    [X]pt has hcp     [ ] pt declined to assign hcp  Discussed with pt @ bedside

## 2017-07-26 NOTE — PROGRESS NOTE ADULT - ASSESSMENT
82F [facility] Alzheimer's dementia (nonverbal, fully dependent on ADL's) and aortic stenosis presents with episode of syncope and possible seizure with postictal state that has now resolved, found to have septic shock of unclear etiology, suspect UTI also acute cholecystitis. Patient is DNR/DNI on comfort care

## 2017-07-26 NOTE — PROGRESS NOTE ADULT - ATTENDING COMMENTS
pt seen and examined  family all at bedside  improved yesterday with addition of atropine sl, increase in morphine to q 4 hrs and addition of ativen pt seen and examined  family all at bedside  improved yesterday with addition of atropine sl, increase in morphine to q 4 hrs and addition of ativan.  emotional support provided pt seen and examined  family all at bedside  somewhat improved yesterday with addition of atropine, change in morphine and addition of ativan however still moaning, gasping at frequent intervals.  family wishes to discuss changing to morphine infusion. change ativan to atc for now. will follow up.. discussed with kinsey barkley who will meet w family  emotional support provided pt seen and examined  family all at bedside  somewhat improved yesterday with addition of atropine, morphine and addition of ativan however still moaning, gasping at frequent intervals.  family wishes to discuss changing to morphine infusion. change ativan to atc for now. will follow up.. discussed with kinsey barkley who will meet w family  emotional support provided pt seen and examined  family all at bedside  pt still moaning and  gasping at frequent intervals.   family wishes to discuss changing to morphine infusion. Discussed with yosi bennett palliative care and we both met with  and daughter at bedside.  expressing concern over pt's obvious discomfort and  making clear his wishes to take measures necessary to ensure maximum comfort for his wife. reviewed morphine infusion with he and his daughter. After discussing at length with all of above  wants to start iv morphine. Order placed, will also continue ativan prn and leave prn morphine iv for breakthough discomfort or dyspnea.   emotional support provided  will remain available myoungmd

## 2017-07-27 PROCEDURE — 73590 X-RAY EXAM OF LOWER LEG: CPT

## 2017-07-27 PROCEDURE — 80076 HEPATIC FUNCTION PANEL: CPT

## 2017-07-27 PROCEDURE — 86850 RBC ANTIBODY SCREEN: CPT

## 2017-07-27 PROCEDURE — 85730 THROMBOPLASTIN TIME PARTIAL: CPT

## 2017-07-27 PROCEDURE — 82803 BLOOD GASES ANY COMBINATION: CPT

## 2017-07-27 PROCEDURE — 86901 BLOOD TYPING SEROLOGIC RH(D): CPT

## 2017-07-27 PROCEDURE — 84484 ASSAY OF TROPONIN QUANT: CPT

## 2017-07-27 PROCEDURE — 82570 ASSAY OF URINE CREATININE: CPT

## 2017-07-27 PROCEDURE — 36430 TRANSFUSION BLD/BLD COMPNT: CPT

## 2017-07-27 PROCEDURE — 94760 N-INVAS EAR/PLS OXIMETRY 1: CPT

## 2017-07-27 PROCEDURE — 83735 ASSAY OF MAGNESIUM: CPT

## 2017-07-27 PROCEDURE — 70450 CT HEAD/BRAIN W/O DYE: CPT

## 2017-07-27 PROCEDURE — 82550 ASSAY OF CK (CPK): CPT

## 2017-07-27 PROCEDURE — 99291 CRITICAL CARE FIRST HOUR: CPT | Mod: 25

## 2017-07-27 PROCEDURE — 87040 BLOOD CULTURE FOR BACTERIA: CPT

## 2017-07-27 PROCEDURE — 84300 ASSAY OF URINE SODIUM: CPT

## 2017-07-27 PROCEDURE — P9016: CPT

## 2017-07-27 PROCEDURE — 96365 THER/PROPH/DIAG IV INF INIT: CPT

## 2017-07-27 PROCEDURE — 81001 URINALYSIS AUTO W/SCOPE: CPT

## 2017-07-27 PROCEDURE — 76700 US EXAM ABDOM COMPLETE: CPT

## 2017-07-27 PROCEDURE — 93005 ELECTROCARDIOGRAM TRACING: CPT

## 2017-07-27 PROCEDURE — 82553 CREATINE MB FRACTION: CPT

## 2017-07-27 PROCEDURE — 72190 X-RAY EXAM OF PELVIS: CPT

## 2017-07-27 PROCEDURE — 86920 COMPATIBILITY TEST SPIN: CPT

## 2017-07-27 PROCEDURE — 85027 COMPLETE CBC AUTOMATED: CPT

## 2017-07-27 PROCEDURE — 80053 COMPREHEN METABOLIC PANEL: CPT

## 2017-07-27 PROCEDURE — 83605 ASSAY OF LACTIC ACID: CPT

## 2017-07-27 PROCEDURE — 36415 COLL VENOUS BLD VENIPUNCTURE: CPT

## 2017-07-27 PROCEDURE — 74176 CT ABD & PELVIS W/O CONTRAST: CPT

## 2017-07-27 PROCEDURE — 85610 PROTHROMBIN TIME: CPT

## 2017-07-27 PROCEDURE — 83036 HEMOGLOBIN GLYCOSYLATED A1C: CPT

## 2017-07-27 PROCEDURE — 86900 BLOOD TYPING SEROLOGIC ABO: CPT

## 2017-07-27 PROCEDURE — 71045 X-RAY EXAM CHEST 1 VIEW: CPT

## 2017-07-27 PROCEDURE — 87086 URINE CULTURE/COLONY COUNT: CPT

## 2017-07-27 PROCEDURE — 80202 ASSAY OF VANCOMYCIN: CPT

## 2017-07-27 PROCEDURE — 82272 OCCULT BLD FECES 1-3 TESTS: CPT

## 2017-07-27 PROCEDURE — 93306 TTE W/DOPPLER COMPLETE: CPT

## 2017-07-27 PROCEDURE — 80048 BASIC METABOLIC PNL TOTAL CA: CPT

## 2017-07-27 PROCEDURE — 84100 ASSAY OF PHOSPHORUS: CPT

## 2017-07-27 RX ADMIN — Medication 0.5 MILLIGRAM(S): at 05:12

## 2017-07-27 RX ADMIN — MORPHINE SULFATE 1 MG/HR: 50 CAPSULE, EXTENDED RELEASE ORAL at 07:14

## 2017-07-27 NOTE — PROGRESS NOTE ADULT - PROBLEM SELECTOR PLAN 4
acute illness  no further labs

## 2017-07-27 NOTE — DISCHARGE NOTE FOR THE EXPIRED PATIENT - SECONDARY DIAGNOSIS.
NSTEMI (non-ST elevated myocardial infarction) Aortic stenosis Alzheimer disease Acalculous cholecystitis

## 2017-07-27 NOTE — PROGRESS NOTE ADULT - PROVIDER SPECIALTY LIST ADULT
Bijan Patel is a 29 year old female presenting with c/o vaginal discharge and itching.   Sx present for 3-4 days.    Last Pap w/  HPV- 10/8/15 and showed ASCUS with NEGATIVE HPV  GC/Chlamydia - 8/5/16 and was NEGATIVE   Wet [place]- 8/5/16 and was NEGATIVE  LMP- on DEPO-PROVERA.   Sexually active: NO.    OTC treatment- NONE     PCP- Rosemarie Meadows MD     Health Maintenance reviewed:  Health Maintenance Summary     Topic Due On Due Status Completed On    PAP SMEAR - CERVICAL CANCER SCREENING Oct 8, 2018 Not Due Oct 8, 2015    Immunization - Td/Tdap Sep 12, 2026 Not Due Sep 12, 2016    Immunization - TDAP Pregnancy  Hidden     Immunization-Influenza  Completed Sep 12, 2016          Patient is up to date, no discussion needed .    Denies known Latex allergy or symptoms of Latex sensitivity.   Medications reviewed with patient.   Pharmacy verified.   Tobacco hx verified.     Phone number verified:        Cell Phone:   Telephone Information:   Mobile [phone]   .  Okay to leave a message containing results? Yes     
Cardiology
Critical Care
Infectious Disease
Internal Medicine
Neurology
Orthopedics
Surgery
Critical Care
Hospitalist

## 2017-07-27 NOTE — PROGRESS NOTE ADULT - PROBLEM SELECTOR PROBLEM 1
Septic shock
Acalculous cholecystitis
Septic shock

## 2017-07-27 NOTE — CHART NOTE - NSCHARTNOTEFT_GEN_A_CORE
Called by RN because patient appears to no longer be breathing. Assessed pt at bedside. Pt unresponsive to verbal, physical, and painful stimuli. No Pulses palpable at radial, carotid, or femoral arteries. No heart or lung sounds heard on auscultation. No corneal reflex noted.    Time of Death: 9:06 am     at bedside. Attending (Dr Perlman) notified.        Jennifer Hayes, PGY-1

## 2017-07-27 NOTE — DISCHARGE NOTE FOR THE EXPIRED PATIENT - HOSPITAL COURSE
81 y/o F with PMHx of Alzheimer Dementia, aortic stenosis presented to the ED with syncope and hypotension. Patient lives at home with  and 24 hour aide Romy. Per aide, patient was getting a sponge bath when her arms became stiff. She was on the commode and began "shaking violently," and held her hand to her chest and . Patient is non-verbal so  and aide were unsure if she was in pain or this was an involuntary motion. Family states that her current level of consciousness is her baseline. Family states that she has been grinding her teeth for the last 6 months, and was grinding it more intensely on the day prior to admission. Patient is incontinent of urine at baseline, using diapers. Family denies patient being febrile. Had one episode of nonbloody, nonbilious vomiting in the ED. Of note, patient was on depakote for Alzheimer dementia and not for seizures. Has been off depakote for the last year.      In the ED, labs were significant for leukocytosis with WBC of 24.6, PT/INR of 13.8/1.26, aPTT of 46.0, Lactate of 5.0. Patient was afebrile but became hypotensive to 70/39. Given NS boluses x 5 and vanco/zosyn x 1. Had IO placed in left proximal tibia and started on levophed. EKG showed NSR at 66bpm, unchanged from prior study. CT head was negative for acute pathology, showed chronic brain findings c/w small vessel disease. CXR showed no acute pathology. Dr. Borjas (cardio) and Dr. Harris (ICU) evaluated patient.    Patient was accepted to ICU for septic shock, given fluid resuscitation and started on pressor support through central line. Hospital course complicated by acalculous cholecystitis and NSTEMI. Patient was made DNR/DNI on comfort measures only and transferred to general medical floor. Was given morphine for pain and respiratory distress. Ultimately switched to morphine drip. Given atropine drops and scopolamine for excess secretions. Patient was pronounced dead based on cardiopulmonary criteria on 7/27/2017 at 9:06AM. Absent breath sounds, absent heart sounds, absent corneal reflex, unresponsive to verbal and painful stimuli. Next of kin (Keon Diego) notified and family present at bedside. 83 y/o F with PMHx of Alzheimer Dementia, aortic stenosis presented to the ED with syncope and hypotension. Patient lives at home with  and 24 hour aide Romy. Per aide, patient was getting a sponge bath when her arms became stiff. She was on the commode and began "shaking violently," and held her hand to her chest and . Patient is non-verbal so  and aide were unsure if she was in pain or this was an involuntary motion. Family states that her current level of consciousness is her baseline. Family states that she has been grinding her teeth for the last 6 months, and was grinding it more intensely on the day prior to admission. Patient is incontinent of urine at baseline, using diapers. Family denies patient being febrile. Had one episode of nonbloody, nonbilious vomiting in the ED. Of note, patient was on depakote for Alzheimer dementia and not for seizures. Has been off depakote for the last year.      In the ED, labs were significant for leukocytosis with WBC of 24.6, PT/INR of 13.8/1.26, aPTT of 46.0, Lactate of 5.0. Patient was afebrile but became hypotensive to 70/39. Given NS boluses x 5 and vanco/zosyn x 1. Had IO placed in left proximal tibia and started on levophed. EKG showed NSR at 66bpm, unchanged from prior study. CT head was negative for acute pathology, showed chronic brain findings c/w small vessel disease. CXR showed no acute pathology. Dr. Borjas (cardio) and Dr. Harris (ICU) evaluated patient.    Patient was accepted to ICU for septic shock, given fluid resuscitation and started on pressor support through central line. Hospital course complicated by acalculous cholecystitis and NSTEMI. Patient was made DNR/DNI on comfort measures only and transferred to general medical floor. Was given morphine for pain and respiratory distress. Ultimately switched to morphine drip. Given atropine drops and scopolamine for excess secretions. Patient was pronounced dead based on cardiopulmonary criteria on 7/27/2017 at 9:06AM. Pupils fixed and dilated, absent breath sounds, absent heart sounds, absent corneal reflex, absent pulses, unresponsive to verbal and painful stimuli. Next of kin (Keon Diego) notified and family present at bedside.    General: pale, unresponsive  HEENT: pupils fixed and dilated, unresponsive to light  Cardio: absent heart sounds  Resp: absent breath sounds  Abd: nondistended, no bowel sounds  Extremities: edematous with ecchymoses  Skin: jaundiced; R neck central line removed 81 y/o F with PMHx of Alzheimer Dementia, aortic stenosis presented to the ED with syncope and hypotension. Patient lives at home with  and 24 hour aide Romy. Per aide, patient was getting a sponge bath when her arms became stiff. She was on the commode and began "shaking violently," and held her hand to her chest and . Patient is non-verbal so  and aide were unsure if she was in pain or this was an involuntary motion. Family states that her current level of consciousness is her baseline. Family states that she has been grinding her teeth for the last 6 months, and was grinding it more intensely on the day prior to admission. Patient is incontinent of urine at baseline, using diapers. Family denies patient being febrile. Had one episode of nonbloody, nonbilious vomiting in the ED. Of note, patient was on depakote for Alzheimer dementia and not for seizures. Has been off depakote for the last year.      In the ED, labs were significant for leukocytosis with WBC of 24.6, PT/INR of 13.8/1.26, aPTT of 46.0, Lactate of 5.0. Patient was afebrile but became hypotensive to 70/39. Given NS boluses x 5 and vanco/zosyn x 1. Had IO placed in left proximal tibia and started on levophed. EKG showed NSR at 66bpm, unchanged from prior study. CT head was negative for acute pathology, showed chronic brain findings c/w small vessel disease. CXR showed no acute pathology. Dr. Borjas (cardio) and Dr. Harris (ICU) evaluated patient.    Patient was accepted to ICU for septic shock, given fluid resuscitation and started on pressor support through central line. Hospital course complicated by acalculous cholecystitis and NSTEMI. Patient was made DNR/DNI on comfort measures only and transferred to general medical floor. Was given morphine for pain and respiratory distress. Ultimately switched to morphine drip. Given atropine drops and scopolamine for excess secretions. Patient was pronounced dead based on cardiopulmonary criteria on 7/27/2017 at 9:06AM. Pupils fixed and dilated, absent breath sounds, absent heart sounds, absent corneal reflex, absent pulses, unresponsive to verbal and painful stimuli. Next of kin (Keon Diego) notified and family present at bedside.    General: pale, unresponsive  HEENT: pupils fixed and dilated, unresponsive to light  Cardio: absent heart sounds  Resp: absent breath sounds  Abd: nondistended, no bowel sounds  Extremities: edematous with multiple areas of ecchymoses  Vasc: absent carotid, radial and femoral pulses  Skin: jaundiced; R neck central line removed 81 y/o F with PMHx of Alzheimer Dementia, aortic stenosis presented to the ED with syncope and hypotension. Patient lives at home with  and 24 hour aide Romy. Per aide, patient was getting a sponge bath when her arms became stiff. She was on the commode and began "shaking violently," and held her hand to her chest and . Patient is non-verbal so  and aide were unsure if she was in pain or this was an involuntary motion. Family states that her current level of consciousness is her baseline. Family states that she has been grinding her teeth for the last 6 months, and was grinding it more intensely on the day prior to admission. Patient is incontinent of urine at baseline, using diapers. Family denies patient being febrile. Had one episode of nonbloody, nonbilious vomiting in the ED. Of note, patient was on depakote for Alzheimer dementia and not for seizures. Has been off depakote for the last year.      In the ED, labs were significant for leukocytosis with WBC of 24.6, PT/INR of 13.8/1.26, aPTT of 46.0, Lactate of 5.0. Patient was afebrile but became hypotensive to 70/39. Given NS boluses x 5 and vanco/zosyn x 1. Had IO placed in left proximal tibia and started on levophed. EKG showed NSR at 66bpm, unchanged from prior study. CT head was negative for acute pathology, showed chronic brain findings c/w small vessel disease. CXR showed no acute pathology. Dr. Borjas (cardio) and Dr. Harris (ICU) evaluated patient.    Patient was accepted to ICU for septic shock, given fluid resuscitation and started on pressor support through central line. Hospital course complicated by acalculous cholecystitis and NSTEMI. Patient was made DNR/DNI on comfort measures only and transferred to general medical floor. Was given morphine for pain and respiratory distress. Ultimately switched to morphine drip. Given atropine drops and scopolamine for excess secretions. Patient was pronounced dead based on cardiopulmonary criteria on 7/27/2017 at 9:06AM. Pupils fixed and dilated, absent breath sounds, absent heart sounds, absent corneal reflex, absent pulses, unresponsive to verbal and painful stimuli. Next of kin (Keon Diego) notified and family present at bedside.    General: pale, unresponsive  HEENT: pupils fixed and dilated, unresponsive to light  Cardio: absent heart sounds  Resp: absent breath sounds  Abd: nondistended, no bowel sounds  Extremities: edematous with multiple areas of ecchymoses  Vasc: absent carotid, radial and femoral pulses  Skin: jaundiced; R neck central line removed  Neuro: unresponsive to painful stimuli, absent corneal reflex 83 y/o F with PMHx of Alzheimer Dementia, aortic stenosis presented to the ED with syncope and hypotension. Patient lives at home with  and 24 hour aide Romy. Per aide, patient was getting a sponge bath when her arms became stiff. She was on the commode and began "shaking violently," and held her hand to her chest and . Patient is non-verbal so  and aide were unsure if she was in pain or this was an involuntary motion. Family states that her current level of consciousness is her baseline. Family states that she has been grinding her teeth for the last 6 months, and was grinding it more intensely on the day prior to admission. Patient is incontinent of urine at baseline, using diapers. Family denies patient being febrile. Had one episode of nonbloody, nonbilious vomiting in the ED. Of note, patient was on depakote for Alzheimer dementia and not for seizures. Has been off depakote for the last year.      In the ED, labs were significant for leukocytosis with WBC of 24.6, PT/INR of 13.8/1.26, aPTT of 46.0, Lactate of 5.0. Patient was afebrile but became hypotensive to 70/39. Given NS boluses x 5 and vanco/zosyn x 1. Had IO placed in left proximal tibia and started on levophed. EKG showed NSR at 66bpm, unchanged from prior study. CT head was negative for acute pathology, showed chronic brain findings c/w small vessel disease. CXR showed no acute pathology. Dr. Borjas (cardio) and Dr. Harris (ICU) evaluated patient.    Patient was accepted to ICU for septic shock, given fluid resuscitation and started on pressor support through central line. Hospital course complicated by acalculous cholecystitis and NSTEMI. Patient was made DNR/DNI on comfort measures only and transferred to general medical floor. Was given morphine for pain and respiratory distress. Ultimately switched to morphine drip. Given atropine drops and scopolamine for excess secretions. Patient was pronounced dead based on cardiopulmonary criteria on 7/27/2017 at 9:06AM. Pupils fixed and dilated, absent breath sounds, absent heart sounds, absent corneal reflex, absent pulses, unresponsive to verbal and painful stimuli. Next of kin (Keon Diego) notified and family present at bedside. PMD Dr. Rojas called and notified.    General: pale, unresponsive  HEENT: pupils fixed and dilated, unresponsive to light  Cardio: absent heart sounds  Resp: absent breath sounds  Abd: nondistended, no bowel sounds  Extremities: edematous with multiple areas of ecchymoses  Vasc: absent carotid, radial and femoral pulses  Skin: jaundiced; R neck central line removed  Neuro: unresponsive to painful stimuli, absent corneal reflex

## 2017-07-27 NOTE — PROGRESS NOTE ADULT - SUBJECTIVE AND OBJECTIVE BOX
Patient is a 82y old  Female who presents with a chief complaint of Pt passed out at home (18 Jul 2017 13:52)      INTERVAL HPI/OVERNIGHT EVENTS: Patient is DNR/DNI on comfort care. Seen and evaluated at bedside, patient is nonresponsive, appears to be resting comfortably, patient's family does not think she's in any pain. Patient on morphine drip for comfort and pain control. Atropine sublingual was given for excessive secretion. Patient appears to be in agonal breathing.       MEDICATIONS  (STANDING):  scopolamine   Patch 1.5 milliGRAM(s) Transdermal every 3 days  morphine infusion 1ml/hour  atropine 1% sublingual - 2 drops every 8 hours, PRN   Lorazepam injectable - 0.5mg IV push every 6 hours, PRN    MEDICATIONS  (PRN):  acetaminophen  Suppository 650 milliGRAM(s) Rectal every 6 hours PRN For Temp greater than 38 C (100.4 F)  morphine  - Injectable 2 milliGRAM(s) IV Push every 1 hour PRN mild pain or discomfort or dyspnea  atropine 1% sublingual - 2 drops every 8 hours, PRN   Lorazepam injectable - 0.5mg IV push every 6 hours, PRN    Allergies    No Known Allergies    Intolerances        REVIEW OF SYSTEMS:  CONSTITUTIONAL: sedated and nonresponsive, on morphine  ENMT: sedated  RESPIRATORY: No cough, sedated  CARDIOVASCULAR: sedated  GASTROINTESTINAL: sedated  GENITOURINARY: burns catheter  NEUROLOGICAL: sedated   ROS  [ ] Unable to obtain   REST OF REVIEW Of SYSTEM - [ ] Normal     Height (cm): 162.6 (07-17 @ 23:24)  Weight (kg): 65.8 (07-17 @ 09:38)  BMI (kg/m2): 24.9 (07-17 @ 23:24)  BSA (m2): 1.71 (07-17 @ 23:24)  Vital Signs Last 24 Hrs  T(C): -- family refusing vital  T(F): --  HR: --  BP: --  BP(mean): --  RR: --  SpO2: --  [ ] room air   [x] 02    PHYSICAL EXAM:  GENERAL:  No acute distresss, sedated  HEAD:  normal  ENMT: normal  NECK:  normal    NERVOUS SYSTEM: [ ]Confusion  [ ] Encephalopathic [X] Sedated [ ] Other  CHEST/LUNG: [X] decreased breath sounds at bases  [ ] wheezing   [ ] rhonchi  [ ] crackles  HEART:  Regular rate and rhythm  ABDOMEN:  soft, non distended  SKIN: [ ] venous stasis skin changes    LABS:            Hemoglobin A1C, Whole Blood: 5.4 % (07-19 @ 08:50)      CAPILLARY BLOOD GLUCOSE        Cultures          RADIOLOGY & ADDITIONAL TESTS:      Care Discussed with [X] Consultants  [ ] Patient  [X] Family  [X]   /   [X] Other; RN  DVT prophylaxis [ ] lovenox   [ ] subq heparin  [ ] coumadin  [ ] venodynes [ ] ambulating frequently at how risk for vte and no pharm         or  mechanical prophylaxis required    [ ] other   Advanced directive:    [X]pt has hcp     [ ] pt declined to assign hcp  Discussed with pt @ bedside

## 2017-07-27 NOTE — PROGRESS NOTE ADULT - ASSESSMENT
82F [facility] Advanced Alzheimer's dementia (nonverbal, fully dependent on ADL's) and aortic stenosis presents with episode of syncope and possible seizure with postictal state that has now resolved, found to have septic shock of unclear etiology, suspect UTI also acute cholecystitis. Patient is DNR/DNI on comfort care

## 2017-07-27 NOTE — PROGRESS NOTE ADULT - PROBLEM SELECTOR PROBLEM 3
Acetabular fracture

## 2017-07-31 DIAGNOSIS — Z82.49 FAMILY HISTORY OF ISCHEMIC HEART DISEASE AND OTHER DISEASES OF THE CIRCULATORY SYSTEM: ICD-10-CM

## 2017-07-31 DIAGNOSIS — D50.0 IRON DEFICIENCY ANEMIA SECONDARY TO BLOOD LOSS (CHRONIC): ICD-10-CM

## 2017-07-31 DIAGNOSIS — R65.21 SEVERE SEPSIS WITH SEPTIC SHOCK: ICD-10-CM

## 2017-07-31 DIAGNOSIS — F02.81 DEMENTIA IN OTHER DISEASES CLASSIFIED ELSEWHERE, UNSPECIFIED SEVERITY, WITH BEHAVIORAL DISTURBANCE: ICD-10-CM

## 2017-07-31 DIAGNOSIS — K81.0 ACUTE CHOLECYSTITIS: ICD-10-CM

## 2017-07-31 DIAGNOSIS — I24.8 OTHER FORMS OF ACUTE ISCHEMIC HEART DISEASE: ICD-10-CM

## 2017-07-31 DIAGNOSIS — I21.4 NON-ST ELEVATION (NSTEMI) MYOCARDIAL INFARCTION: ICD-10-CM

## 2017-07-31 DIAGNOSIS — S32.302A UNSPECIFIED FRACTURE OF LEFT ILIUM, INITIAL ENCOUNTER FOR CLOSED FRACTURE: ICD-10-CM
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
## 2017-07-31 DIAGNOSIS — S32.491A OTHER SPECIFIED FRACTURE OF RIGHT ACETABULUM, INITIAL ENCOUNTER FOR CLOSED FRACTURE: ICD-10-CM

## 2017-07-31 DIAGNOSIS — N17.9 ACUTE KIDNEY FAILURE, UNSPECIFIED: ICD-10-CM
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
## 2017-07-31 DIAGNOSIS — S32.492A OTHER SPECIFIED FRACTURE OF LEFT ACETABULUM, INITIAL ENCOUNTER FOR CLOSED FRACTURE: ICD-10-CM

## 2017-07-31 DIAGNOSIS — G30.9 ALZHEIMER'S DISEASE, UNSPECIFIED: ICD-10-CM

## 2017-07-31 DIAGNOSIS — R32 UNSPECIFIED URINARY INCONTINENCE: ICD-10-CM

## 2017-07-31 DIAGNOSIS — M24.7 PROTRUSIO ACETABULI: ICD-10-CM

## 2017-07-31 DIAGNOSIS — Z96.0 PRESENCE OF UROGENITAL IMPLANTS: ICD-10-CM

## 2017-07-31 DIAGNOSIS — E87.2 ACIDOSIS: ICD-10-CM

## 2017-07-31 DIAGNOSIS — D68.9 COAGULATION DEFECT, UNSPECIFIED: ICD-10-CM

## 2017-07-31 DIAGNOSIS — Z66 DO NOT RESUSCITATE: ICD-10-CM

## 2017-07-31 DIAGNOSIS — I95.9 HYPOTENSION, UNSPECIFIED: ICD-10-CM

## 2017-07-31 DIAGNOSIS — R18.8 OTHER ASCITES: ICD-10-CM

## 2017-07-31 DIAGNOSIS — L89.629 PRESSURE ULCER OF LEFT HEEL, UNSPECIFIED STAGE: ICD-10-CM

## 2017-07-31 DIAGNOSIS — A41.9 SEPSIS, UNSPECIFIED ORGANISM: ICD-10-CM

## 2017-07-31 DIAGNOSIS — Z79.82 LONG TERM (CURRENT) USE OF ASPIRIN: ICD-10-CM

## 2017-07-31 DIAGNOSIS — G93.41 METABOLIC ENCEPHALOPATHY: ICD-10-CM

## 2017-07-31 DIAGNOSIS — R55 SYNCOPE AND COLLAPSE: ICD-10-CM
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
## 2017-07-31 DIAGNOSIS — I35.1 NONRHEUMATIC AORTIC (VALVE) INSUFFICIENCY: ICD-10-CM

## 2018-03-08 NOTE — PATIENT PROFILE ADULT. - VISION (WITH CORRECTIVE LENSES IF THE PATIENT USUALLY WEARS THEM):
Partially impaired: cannot see medication labels or newsprint, but can see obstacles in path, and the surrounding layout; can count fingers at arm's length Daily Assessment

## 2020-08-17 NOTE — PATIENT PROFILE ADULT. - CENTRAL VENOUS CATHETER
Left message for Patient on   Mobile:    Mobile [phone]    to call back regarding reschedulng their appointment with Hesham Christopher MD on 9/3/2020 for a video or phone or  another date and time due to provider is unavailable.   yes/Lt IO

## 2023-02-15 NOTE — PROGRESS NOTE ADULT - PROBLEM/PLAN-1
The sheath was exchanged.
DISPLAY PLAN FREE TEXT